# Patient Record
Sex: FEMALE | Race: WHITE | Employment: UNEMPLOYED | URBAN - NONMETROPOLITAN AREA
[De-identification: names, ages, dates, MRNs, and addresses within clinical notes are randomized per-mention and may not be internally consistent; named-entity substitution may affect disease eponyms.]

---

## 2022-12-05 RX ORDER — HYDROCHLOROTHIAZIDE 25 MG/1
25 TABLET ORAL DAILY
COMMUNITY

## 2022-12-05 RX ORDER — PROPRANOLOL HYDROCHLORIDE 80 MG/1
80 CAPSULE, EXTENDED RELEASE ORAL DAILY
COMMUNITY

## 2022-12-05 RX ORDER — MULTIVITAMIN WITH IRON
250 TABLET ORAL DAILY
COMMUNITY

## 2022-12-06 ENCOUNTER — HOSPITAL ENCOUNTER (OUTPATIENT)
Dept: PAIN MANAGEMENT | Age: 57
Discharge: HOME | End: 2022-12-06
Payer: COMMERCIAL

## 2022-12-06 ENCOUNTER — HOSPITAL ENCOUNTER (OUTPATIENT)
Dept: PAIN MANAGEMENT | Age: 57
Setting detail: SPECIMEN
Discharge: HOME | End: 2022-12-06
Payer: COMMERCIAL

## 2022-12-06 VITALS
SYSTOLIC BLOOD PRESSURE: 127 MMHG | DIASTOLIC BLOOD PRESSURE: 76 MMHG | HEART RATE: 58 BPM | TEMPERATURE: 97.4 F | RESPIRATION RATE: 18 BRPM | OXYGEN SATURATION: 98 %

## 2022-12-06 DIAGNOSIS — R52 PAIN MANAGEMENT: ICD-10-CM

## 2022-12-06 PROCEDURE — 2580000003 HC RX 258

## 2022-12-06 PROCEDURE — A4216 STERILE WATER/SALINE, 10 ML: HCPCS

## 2022-12-06 PROCEDURE — 2500000003 HC RX 250 WO HCPCS

## 2022-12-06 PROCEDURE — 6360000002 HC RX W HCPCS

## 2022-12-06 ASSESSMENT — PAIN - FUNCTIONAL ASSESSMENT: PAIN_FUNCTIONAL_ASSESSMENT: 0-10

## 2022-12-06 NOTE — INTERVAL H&P NOTE
Update History & Physical    The patient's History and Physical  was reviewed with the patient and I examined the patient. There was no change. The surgical site was confirmed by the patient and me. Plan: The risks, benefits, expected outcome, and alternative to the recommended procedure have been discussed with the patient. Patient understands and wants to proceed with the procedure.      Electronically signed by Mahad Carter MD on 12/6/2022 at 11:00 AM

## 2022-12-13 ENCOUNTER — HOSPITAL ENCOUNTER (OUTPATIENT)
Dept: PAIN MANAGEMENT | Age: 57
Discharge: HOME | End: 2022-12-13
Payer: COMMERCIAL

## 2022-12-13 VITALS
SYSTOLIC BLOOD PRESSURE: 147 MMHG | DIASTOLIC BLOOD PRESSURE: 83 MMHG | TEMPERATURE: 97.4 F | HEART RATE: 65 BPM | RESPIRATION RATE: 18 BRPM | OXYGEN SATURATION: 98 %

## 2022-12-13 DIAGNOSIS — R52 PAIN MANAGEMENT: ICD-10-CM

## 2022-12-13 PROCEDURE — 6360000002 HC RX W HCPCS

## 2022-12-13 PROCEDURE — 2580000003 HC RX 258

## 2022-12-13 PROCEDURE — 62323 NJX INTERLAMINAR LMBR/SAC: CPT

## 2022-12-13 PROCEDURE — A4216 STERILE WATER/SALINE, 10 ML: HCPCS

## 2022-12-13 PROCEDURE — 2500000003 HC RX 250 WO HCPCS

## 2022-12-13 RX ORDER — SODIUM CHLORIDE 9 MG/ML
5 INJECTION INTRAVENOUS ONCE
Status: DISCONTINUED | OUTPATIENT
Start: 2022-12-13 | End: 2022-12-15 | Stop reason: HOSPADM

## 2022-12-13 RX ORDER — METHYLPREDNISOLONE ACETATE 80 MG/ML
80 INJECTION, SUSPENSION INTRA-ARTICULAR; INTRALESIONAL; INTRAMUSCULAR; SOFT TISSUE ONCE
Status: DISCONTINUED | OUTPATIENT
Start: 2022-12-13 | End: 2022-12-15 | Stop reason: HOSPADM

## 2022-12-13 RX ORDER — LIDOCAINE HYDROCHLORIDE 10 MG/ML
5 INJECTION, SOLUTION EPIDURAL; INFILTRATION; INTRACAUDAL; PERINEURAL ONCE
Status: DISCONTINUED | OUTPATIENT
Start: 2022-12-13 | End: 2022-12-15 | Stop reason: HOSPADM

## 2022-12-13 ASSESSMENT — PAIN - FUNCTIONAL ASSESSMENT: PAIN_FUNCTIONAL_ASSESSMENT: 0-10

## 2022-12-13 NOTE — INTERVAL H&P NOTE
Update History & Physical    The patient's History and Physical  was reviewed with the patient and I examined the patient. There was no change. The surgical site was confirmed by the patient and me. Plan: The risks, benefits, expected outcome, and alternative to the recommended procedure have been discussed with the patient. Patient understands and wants to proceed with the procedure.      Electronically signed by Mahad Carter MD on 12/13/2022 at 9:06 AM

## 2023-11-28 ENCOUNTER — OFFICE VISIT (OUTPATIENT)
Dept: NEUROSURGERY | Facility: CLINIC | Age: 58
End: 2023-11-28
Payer: COMMERCIAL

## 2023-11-28 VITALS — WEIGHT: 150 LBS | HEIGHT: 65 IN | BODY MASS INDEX: 24.99 KG/M2

## 2023-11-28 DIAGNOSIS — M54.16 LUMBAR RADICULOPATHY: ICD-10-CM

## 2023-11-28 DIAGNOSIS — M51.36 DEGENERATIVE DISC DISEASE, LUMBAR: ICD-10-CM

## 2023-11-28 DIAGNOSIS — Z78.9 NON-SMOKER: Primary | ICD-10-CM

## 2023-11-28 PROCEDURE — 99204 OFFICE O/P NEW MOD 45 MIN: CPT | Performed by: NEUROLOGICAL SURGERY

## 2023-11-28 RX ORDER — HYDROCHLOROTHIAZIDE 25 MG/1
25 TABLET ORAL DAILY
COMMUNITY

## 2023-11-28 RX ORDER — ACETAMINOPHEN 500 MG
1000 TABLET ORAL EVERY 4 HOURS PRN
COMMUNITY

## 2023-11-28 RX ORDER — TRAMADOL HYDROCHLORIDE 50 MG/1
1 TABLET ORAL 4 TIMES DAILY PRN
COMMUNITY

## 2023-11-28 RX ORDER — CYCLOBENZAPRINE HCL 10 MG
10 TABLET ORAL 3 TIMES DAILY PRN
COMMUNITY
Start: 2023-11-22

## 2023-11-28 RX ORDER — PROPRANOLOL HYDROCHLORIDE 80 MG/1
80 CAPSULE, EXTENDED RELEASE ORAL DAILY
COMMUNITY

## 2023-11-28 RX ORDER — GABAPENTIN 300 MG/1
300 CAPSULE ORAL
COMMUNITY
Start: 2023-11-20

## 2023-11-28 RX ORDER — LOSARTAN POTASSIUM 25 MG/1
1 TABLET ORAL DAILY
COMMUNITY
Start: 2023-08-28

## 2023-11-28 RX ORDER — SEMAGLUTIDE 0.68 MG/ML
0.25 INJECTION, SOLUTION SUBCUTANEOUS WEEKLY
COMMUNITY

## 2023-11-28 RX ORDER — MELOXICAM 7.5 MG/1
1 TABLET ORAL 2 TIMES DAILY
COMMUNITY
Start: 2023-08-30

## 2023-11-28 NOTE — PROGRESS NOTES
"    Chief complaint:   Chief Complaint   Patient presents with    Back Pain     Low back pain radiating into left leg and pelvis        Subjective     HPI: I had a chance to see Jyoti today in follow-up and to review her new MRI of the lumbar spine.  She still has a very large disc herniation at L3/4 that moved caudally and clinically she sounds like she is suffering from an L4 radiculopathy.  She does have more room on this side from her hemilaminectomy however I think that the disc herniation is still the major problem although we did run into a large amount of scarring from her initial surgery that limited how much resection we could do without destroying the joint.  She does feel that she got some relief from the original surgery but she still has significant pain when turning over in bed or getting in certain positions.    Review of Systems      Objective      Vital Signs  Ht 165.1 cm (65\")   Wt 68 kg (150 lb)   BMI 24.96 kg/m²     Physical Exam  Neurological:      Mental Status: She is oriented to person, place, and time.      Cranial Nerves: Cranial nerves 2-12 are intact.      Motor: Motor strength is normal.     Gait: Gait is intact.   Psychiatric:         Speech: Speech normal.         Neurologic Exam     Mental Status   Oriented to person, place, and time.   Attention: normal. Concentration: normal.   Speech: speech is normal   Level of consciousness: alert  Knowledge: good.   Normal comprehension.     Cranial Nerves   Cranial nerves II through XII intact.     Motor Exam     Strength   Strength 5/5 throughout.     Sensory Exam   Light touch normal.     Gait, Coordination, and Reflexes     Gait  Gait: normal      Imaging review: MRI of the lumbar spine still shows a disc herniation at L3/4 with caudal migration along the L4 pedicle which makes sense with her symptoms.  She does have very severe degenerative changes at L3/4 also.        Assessment/Plan:   Severe L3-4 disc degeneration with disc " extrusion and L4 radiculopathy    At this point we have tried a number of conservative modalities to try to get her pain under control along with a smaller hemilaminectomy although resection of the disc was limited secondary to scarring from her initial surgery and it is likely going to require resection of the facet to get back down along the L4 pedicle to get as much disc out as possible.  She is unfortunately going to need a fusion at L3/4 and I did offer her a L3/4 transforaminal lumbar interbody fusion to hopefully give her some relief.  We will have to modify her instrumentation previously placed from L3-S1.  I did discuss the risks and benefits of this procedure with her at length and she would like to proceed.  We will work on getting her medically cleared and on the operative schedule at our first opening.    Patient is a nonsmoker  The patient's Body mass index is 24.96 kg/m².. BMI is above normal parameters. Recommendations include: continue with current weight loss program    Diagnoses and all orders for this visit:    1. Non-smoker (Primary)    2. Degenerative disc disease, lumbar    3. Lumbar radiculopathy        I discussed the patients findings and my recommendations with patient  Shin Rojo DO  11/28/23  14:45 CST

## 2023-12-19 PROBLEM — M51.369 DEGENERATIVE DISC DISEASE, LUMBAR: Status: ACTIVE | Noted: 2023-11-28

## 2023-12-19 PROBLEM — M51.36 DEGENERATIVE DISC DISEASE, LUMBAR: Status: ACTIVE | Noted: 2023-11-28

## 2023-12-19 PROBLEM — M54.16 LUMBAR RADICULOPATHY: Status: ACTIVE | Noted: 2023-11-28

## 2024-01-16 ENCOUNTER — TELEPHONE (OUTPATIENT)
Dept: NEUROSURGERY | Facility: CLINIC | Age: 59
End: 2024-01-16
Payer: COMMERCIAL

## 2024-01-16 NOTE — TELEPHONE ENCOUNTER
RECEIVED A VOICE MAIL FROM PT THAT AT THIS TIME SHE WOULD LIKE TO CANCEL HER SURGERY. SHE WOULD LIKE TO CONTINUE TO RESEARCH HER OPTIONS PRIOR TO COMMITTING TO THE SURGERY. SHE STATES SHE WILL CALL US BACK IF AND WHEN SHE IS READY TO R/S.     I DID CALL PT BACK AND LET HER KNOW I HAD CANCELLED HER SURGERY AND PRE OP APPTOINTMENTS. PT VOICED UNDERSTANDING.

## 2024-02-12 ENCOUNTER — OFFICE VISIT (OUTPATIENT)
Dept: NEUROSURGERY | Facility: CLINIC | Age: 59
End: 2024-02-12
Payer: COMMERCIAL

## 2024-02-12 VITALS — HEIGHT: 65 IN | WEIGHT: 150 LBS | BODY MASS INDEX: 24.99 KG/M2

## 2024-02-12 DIAGNOSIS — Z78.9 NON-SMOKER: ICD-10-CM

## 2024-02-12 DIAGNOSIS — M54.16 LUMBAR RADICULOPATHY: ICD-10-CM

## 2024-02-12 DIAGNOSIS — M51.36 DEGENERATIVE DISC DISEASE, LUMBAR: Primary | ICD-10-CM

## 2024-02-12 PROCEDURE — 99213 OFFICE O/P EST LOW 20 MIN: CPT | Performed by: NEUROLOGICAL SURGERY

## 2024-02-12 RX ORDER — GABAPENTIN 300 MG/1
300 CAPSULE ORAL
COMMUNITY

## 2024-02-12 RX ORDER — NORTRIPTYLINE HYDROCHLORIDE 10 MG/1
10 CAPSULE ORAL NIGHTLY
COMMUNITY
Start: 2023-12-14

## 2024-02-12 RX ORDER — CARVEDILOL 12.5 MG/1
12.5 TABLET ORAL 2 TIMES DAILY WITH MEALS
COMMUNITY
Start: 2024-01-22

## 2024-02-27 ENCOUNTER — TELEPHONE (OUTPATIENT)
Dept: NEUROSURGERY | Facility: CLINIC | Age: 59
End: 2024-02-27
Payer: COMMERCIAL

## 2024-02-27 NOTE — TELEPHONE ENCOUNTER
----- Message from Marly Emery CMA sent at 2/26/2024  7:39 PM CST -----  Regarding: FW: Cracked ribs  Contact: [phone]    ----- Message -----  From: Jyoti Dick  Sent: 2/26/2024   6:36 PM CST  To: [facility] Clinical Pool  Subject: Cracked ribs                                     Hi Dr. Rojo.     I took a fall last Monday and ended up in the ER at [facility]. I have 3 cracked ribs and a couple staples in my head. I also spent a couple days in [facility] last week due to low oxygen levels from shortness of breath, caused by pain.  I’m hoping this will not affect my surgery date with you on March 28. Let me know what you think.      Best,   Juani Dick

## 2024-03-18 ENCOUNTER — PRE-ADMISSION TESTING (OUTPATIENT)
Dept: PREADMISSION TESTING | Facility: HOSPITAL | Age: 59
End: 2024-03-18
Payer: COMMERCIAL

## 2024-03-18 VITALS
DIASTOLIC BLOOD PRESSURE: 78 MMHG | WEIGHT: 173.28 LBS | HEIGHT: 64 IN | OXYGEN SATURATION: 98 % | BODY MASS INDEX: 29.58 KG/M2 | HEART RATE: 74 BPM | RESPIRATION RATE: 16 BRPM | SYSTOLIC BLOOD PRESSURE: 164 MMHG

## 2024-03-18 LAB
ANION GAP SERPL CALCULATED.3IONS-SCNC: 9 MMOL/L (ref 5–15)
BUN SERPL-MCNC: 24 MG/DL (ref 6–20)
BUN/CREAT SERPL: 54.5 (ref 7–25)
CALCIUM SPEC-SCNC: 9.6 MG/DL (ref 8.6–10.5)
CHLORIDE SERPL-SCNC: 99 MMOL/L (ref 98–107)
CO2 SERPL-SCNC: 31 MMOL/L (ref 22–29)
CREAT SERPL-MCNC: 0.44 MG/DL (ref 0.57–1)
DEPRECATED RDW RBC AUTO: 50.5 FL (ref 37–54)
EGFRCR SERPLBLD CKD-EPI 2021: 111.6 ML/MIN/1.73
ERYTHROCYTE [DISTWIDTH] IN BLOOD BY AUTOMATED COUNT: 13.8 % (ref 12.3–15.4)
GLUCOSE SERPL-MCNC: 101 MG/DL (ref 65–99)
HCT VFR BLD AUTO: 38.1 % (ref 34–46.6)
HGB BLD-MCNC: 12.3 G/DL (ref 12–15.9)
MCH RBC QN AUTO: 32.2 PG (ref 26.6–33)
MCHC RBC AUTO-ENTMCNC: 32.3 G/DL (ref 31.5–35.7)
MCV RBC AUTO: 99.7 FL (ref 79–97)
PLATELET # BLD AUTO: 249 10*3/MM3 (ref 140–450)
PMV BLD AUTO: 9.8 FL (ref 6–12)
POTASSIUM SERPL-SCNC: 4.2 MMOL/L (ref 3.5–5.2)
RBC # BLD AUTO: 3.82 10*6/MM3 (ref 3.77–5.28)
SODIUM SERPL-SCNC: 139 MMOL/L (ref 136–145)
WBC NRBC COR # BLD AUTO: 7.51 10*3/MM3 (ref 3.4–10.8)

## 2024-03-18 PROCEDURE — 80048 BASIC METABOLIC PNL TOTAL CA: CPT

## 2024-03-18 PROCEDURE — 36415 COLL VENOUS BLD VENIPUNCTURE: CPT

## 2024-03-18 PROCEDURE — 85027 COMPLETE CBC AUTOMATED: CPT

## 2024-03-18 PROCEDURE — 93005 ELECTROCARDIOGRAM TRACING: CPT

## 2024-03-18 RX ORDER — LANOLIN ALCOHOL/MO/W.PET/CERES
1000 CREAM (GRAM) TOPICAL DAILY
COMMUNITY

## 2024-03-18 RX ORDER — CALCIUM CARBONATE 500 MG/1
1 TABLET, CHEWABLE ORAL 2 TIMES DAILY
COMMUNITY

## 2024-03-18 RX ORDER — ACETAMINOPHEN 500 MG
1000 TABLET ORAL EVERY 6 HOURS PRN
COMMUNITY

## 2024-03-18 RX ORDER — UREA 10 %
140 LOTION (ML) TOPICAL WEEKLY
COMMUNITY

## 2024-03-18 NOTE — DISCHARGE INSTRUCTIONS
Preparing for Surgery  Follow these instructions before the procedure:  Several days or weeks before your procedure  Medication(s) you need to stop   ____7___ days/week prior to surgery - ozempic      Ask your health care provider about:  Changing or stopping your regular medicines. This is especially important if you are taking diabetes medicines or blood thinners.  Taking medicines such as aspirin and non-steroidal anti-inflammatory drugs (NSAIDS) such as ibuprofen that can thin your blood. Do not take these medicines unless your health care provider tells you to take them.  Taking over-the-counter medicines, vitamins, herbs, and supplements.  Contact your surgeon if you:  Develop a fever of more than 100.4°F (38°C) or other feelings of illness during the 48 hours before your surgery.  Have symptoms that get worse.  Have questions or concerns about your surgery.  If you are going home the same day of your surgery you will need to arrange for a responsible adult, age 18 years old or older, to drive you home from the hospital and stay with you for 24 hours. Verification of the  will be made prior to any procedure requiring sedation. You may not go home in a taxi or any form of public transportation by yourself.     Day before your procedure  Medication(s) you need to stop the day before your surgery: losartan    24 hours before your procedure DO NOT drink alcoholic beverages or smoke.  24 hours before your procedure STOP taking Erectile Dysfunction medication (i.e.,Cialis, Viagra)   You may be asked to shower with a germ-killing soap.  Day of your procedure   You may take the following medication(s) the morning of surgery with a sip of water: carvedilol, nexium, tramadol, gabapentin      8 hours before your procedure STOP all food, any dairy products, and full liquids. This includes hard candy, chewing gum or mints. This is extremely important to prevent serious complications.   Up to 2 hours before your  scheduled arrival time, you may have clear liquids no cream, powder, or pulp of any kind. Safe options are water, black coffee, plain tea, soda, Gatorade/Powerade, clear broth, apple juice.  2 hours before your scheduled arrival time, STOP drinking clear liquids.  You may need to take another shower with a germ-killing soap before you leave home in the morning. Do not use perfumes, colognes, or body lotions.  Wear comfortable loose-fitting clothing.  Remove all jewelry including body piercing and rings, dark colored nail polish, and make up prior to arrival at the hospital. Leave all valuables at home.   Bring your hearing aids if you rely on them.  Do not wear contact lenses. If you wear eyeglasses remember to bring a case to store them in while you are in surgery.  Do not use denture adhesives since you will be asked to remove them during your surgery.    You do not need to bring your home medications into the hospital.   Bring your sleep apnea device with you on the day of your surgery (if this applies to you).  If you wear portable oxygen, bring it with you.   If you are staying overnight, you may bring a bag of items you may need such as slippers, robe and a change of clothes for your discharge. You may want to leave these items in the car until you are ready for them since your family will take your belongings when you leave the pre-operative area.  Arrive at the hospital as scheduled by the office. You will be asked to arrive 2 hours prior to your surgery time in order to prepare for your procedure.  When you arrive at the hospital  Go to the registration desk located at the main entrance of the hospital.  After registration is completed, you will be given a beeper and a sticker sheet. Take the stickers to Outpatient Surgery and place in the tray at the end of the desk to notify the staff that you have arrived and registered.   Return to the lobby to wait. You are not always called back according to the time  of arrival but rather the time your doctor will be ready.  When your beeper lights up and vibrates proceed through the double doors, under the stairs, and a member of the Outpatient Surgery staff will escort you to your preoperative room.   How to Use Chlorhexidine Before Surgery  Chlorhexidine gluconate (CHG) is a germ-killing (antiseptic) solution that is used to clean the skin. It can get rid of the bacteria that normally live on the skin and can keep them away for about 24 hours. To clean your skin with CHG, you may be given:  A CHG solution to use in the shower or as part of a sponge bath.  A prepackaged cloth that contains CHG.  Cleaning your skin with CHG may help lower the risk for infection:  While you are staying in the intensive care unit of the hospital.  If you have a vascular access, such as a central line, to provide short-term or long-term access to your veins.  If you have a catheter to drain urine from your bladder.  If you are on a ventilator. A ventilator is a machine that helps you breathe by moving air in and out of your lungs.  After surgery.  What are the risks?  Risks of using CHG include:  A skin reaction.  Hearing loss, if CHG gets in your ears and you have a perforated eardrum.  Eye injury, if CHG gets in your eyes and is not rinsed out.  The CHG product catching fire.  Make sure that you avoid smoking and flames after applying CHG to your skin.  Do not use CHG:  If you have a chlorhexidine allergy or have previously reacted to chlorhexidine.  On babies younger than 2 months of age.  How to use CHG solution  Use CHG only as told by your health care provider, and follow the instructions on the label.  Use the full amount of CHG as directed. Usually, this is one bottle.  During a shower    Follow these steps when using CHG solution during a shower (unless your health care provider gives you different instructions):  Start the shower.  Use your normal soap and shampoo to wash your face and  hair.  Turn off the shower or move out of the shower stream.  Pour the CHG onto a clean washcloth. Do not use any type of brush or rough-edged sponge.  Starting at your neck, lather your body down to your toes. Make sure you follow these instructions:  If you will be having surgery, pay special attention to the part of your body where you will be having surgery. Scrub this area for at least 1 minute.  Do not use CHG on your head or face. If the solution gets into your ears or eyes, rinse them well with water.  Avoid your genital area.  Avoid any areas of skin that have broken skin, cuts, or scrapes.  Scrub your back and under your arms. Make sure to wash skin folds.  Let the lather sit on your skin for 1-2 minutes or as long as told by your health care provider.  Thoroughly rinse your entire body in the shower. Make sure that all body creases and crevices are rinsed well.  Dry off with a clean towel. Do not put any substances on your body afterward--such as powder, lotion, or perfume--unless you are told to do so by your health care provider. Only use lotions that are recommended by the .  Put on clean clothes or pajamas.  If it is the night before your surgery, sleep in clean sheets.     During a sponge bath  Follow these steps when using CHG solution during a sponge bath (unless your health care provider gives you different instructions):  Use your normal soap and shampoo to wash your face and hair.  Pour the CHG onto a clean washcloth.  Starting at your neck, lather your body down to your toes. Make sure you follow these instructions:  If you will be having surgery, pay special attention to the part of your body where you will be having surgery. Scrub this area for at least 1 minute.  Do not use CHG on your head or face. If the solution gets into your ears or eyes, rinse them well with water.  Avoid your genital area.  Avoid any areas of skin that have broken skin, cuts, or scrapes.  Scrub your back  and under your arms. Make sure to wash skin folds.  Let the lather sit on your skin for 1-2 minutes or as long as told by your health care provider.  Using a different clean, wet washcloth, thoroughly rinse your entire body. Make sure that all body creases and crevices are rinsed well.  Dry off with a clean towel. Do not put any substances on your body afterward--such as powder, lotion, or perfume--unless you are told to do so by your health care provider. Only use lotions that are recommended by the .  Put on clean clothes or pajamas.  If it is the night before your surgery, sleep in clean sheets.  How to use CHG prepackaged cloths  Only use CHG cloths as told by your health care provider, and follow the instructions on the label.  Use the CHG cloth on clean, dry skin.  Do not use the CHG cloth on your head or face unless your health care provider tells you to.  When washing with the CHG cloth:  Avoid your genital area.  Avoid any areas of skin that have broken skin, cuts, or scrapes.  Before surgery    Follow these steps when using a CHG cloth to clean before surgery (unless your health care provider gives you different instructions):  Using the CHG cloth, vigorously scrub the part of your body where you will be having surgery. Scrub using a back-and-forth motion for 3 minutes. The area on your body should be completely wet with CHG when you are done scrubbing.  Do not rinse. Discard the cloth and let the area air-dry. Do not put any substances on the area afterward, such as powder, lotion, or perfume.  Put on clean clothes or pajamas.  If it is the night before your surgery, sleep in clean sheets.     For general bathing  Follow these steps when using CHG cloths for general bathing (unless your health care provider gives you different instructions).  Use a separate CHG cloth for each area of your body. Make sure you wash between any folds of skin and between your fingers and toes. Wash your body in  the following order, switching to a new cloth after each step:  The front of your neck, shoulders, and chest.  Both of your arms, under your arms, and your hands.  Your stomach and groin area, avoiding the genitals.  Your right leg and foot.  Your left leg and foot.  The back of your neck, your back, and your buttocks.  Do not rinse. Discard the cloth and let the area air-dry. Do not put any substances on your body afterward--such as powder, lotion, or perfume--unless you are told to do so by your health care provider. Only use lotions that are recommended by the .  Put on clean clothes or pajamas.  Contact a health care provider if:  Your skin gets irritated after scrubbing.  You have questions about using your solution or cloth.  You swallow any chlorhexidine. Call your local poison control center (1-454.602.7846 in the [place]  Get help right away if:  Your eyes itch badly, or they become very red or swollen.  Your skin itches badly and is red or swollen.  Your hearing changes.  You have trouble seeing.  You have swelling or tingling in your mouth or throat.  You have trouble breathing.  These symptoms may represent a serious problem that is an emergency. Do not wait to see if the symptoms will go away. Get medical help right away. Call your local emergency services (284 in the [place] Do not drive yourself to the hospital.  Summary  Chlorhexidine gluconate (CHG) is a germ-killing (antiseptic) solution that is used to clean the skin. Cleaning your skin with CHG may help to lower your risk for infection.  You may be given CHG to use for bathing. It may be in a bottle or in a prepackaged cloth to use on your skin. Carefully follow your health care provider's instructions and the instructions on the product label.  Do not use CHG if you have a chlorhexidine allergy.  Contact your health care provider if your skin gets irritated after scrubbing.  This information is not intended to replace advice given to  you by your health care provider. Make sure you discuss any questions you have with your health care provider.  Document Revised: 04/17/2023 Document Reviewed: 02/28/2022  Elsevier Patient Education © 2023 [employer]

## 2024-03-20 LAB
QT INTERVAL: 430 MS
QTC INTERVAL: 460 MS

## 2024-03-28 ENCOUNTER — APPOINTMENT (OUTPATIENT)
Dept: GENERAL RADIOLOGY | Facility: HOSPITAL | Age: 59
End: 2024-03-28
Payer: COMMERCIAL

## 2024-03-28 ENCOUNTER — ANESTHESIA EVENT (OUTPATIENT)
Dept: PERIOP | Facility: HOSPITAL | Age: 59
End: 2024-03-28
Payer: COMMERCIAL

## 2024-03-28 ENCOUNTER — HOSPITAL ENCOUNTER (OUTPATIENT)
Facility: HOSPITAL | Age: 59
Discharge: HOME | End: 2024-03-29
Attending: NEUROLOGICAL SURGERY | Admitting: NEUROLOGICAL SURGERY
Payer: COMMERCIAL

## 2024-03-28 ENCOUNTER — ANESTHESIA (OUTPATIENT)
Dept: PERIOP | Facility: HOSPITAL | Age: 59
End: 2024-03-28
Payer: COMMERCIAL

## 2024-03-28 DIAGNOSIS — M43.16 SPONDYLOLISTHESIS OF LUMBAR REGION: ICD-10-CM

## 2024-03-28 DIAGNOSIS — Z74.09 IMPAIRED FUNCTIONAL MOBILITY AND ACTIVITY TOLERANCE: Primary | ICD-10-CM

## 2024-03-28 DIAGNOSIS — M51.36 DEGENERATIVE DISC DISEASE, LUMBAR: ICD-10-CM

## 2024-03-28 DIAGNOSIS — M54.16 LUMBAR RADICULOPATHY: ICD-10-CM

## 2024-03-28 PROBLEM — M43.10 SPONDYLOLISTHESIS: Status: ACTIVE | Noted: 2024-03-28

## 2024-03-28 LAB
GLUCOSE BLDC GLUCOMTR-MCNC: 139 MG/DL (ref 70–130)
GLUCOSE BLDC GLUCOMTR-MCNC: 154 MG/DL (ref 70–130)

## 2024-03-28 PROCEDURE — C1713 ANCHOR/SCREW BN/BN,TIS/BN: HCPCS | Performed by: NEUROLOGICAL SURGERY

## 2024-03-28 PROCEDURE — 25010000002 HYDROMORPHONE PER 4 MG: Performed by: ANESTHESIOLOGY

## 2024-03-28 PROCEDURE — 25010000002 PHENYLEPHRINE 10 MG/ML SOLUTION 1 ML VIAL: Performed by: NURSE ANESTHETIST, CERTIFIED REGISTERED

## 2024-03-28 PROCEDURE — 25010000002 FENTANYL CITRATE (PF) 50 MCG/ML SOLUTION: Performed by: ANESTHESIOLOGY

## 2024-03-28 PROCEDURE — 25010000002 FENTANYL CITRATE (PF) 250 MCG/5ML SOLUTION: Performed by: NURSE ANESTHETIST, CERTIFIED REGISTERED

## 2024-03-28 PROCEDURE — 72100 X-RAY EXAM L-S SPINE 2/3 VWS: CPT

## 2024-03-28 PROCEDURE — 82948 REAGENT STRIP/BLOOD GLUCOSE: CPT

## 2024-03-28 PROCEDURE — 25010000002 VANCOMYCIN 1 G RECONSTITUTED SOLUTION 1 EACH VIAL: Performed by: NEUROLOGICAL SURGERY

## 2024-03-28 PROCEDURE — 22853 INSJ BIOMECHANICAL DEVICE: CPT | Performed by: NEUROLOGICAL SURGERY

## 2024-03-28 PROCEDURE — 25810000003 SODIUM CHLORIDE 0.9 % SOLUTION 250 ML FLEX CONT: Performed by: NURSE ANESTHETIST, CERTIFIED REGISTERED

## 2024-03-28 PROCEDURE — S0260 H&P FOR SURGERY: HCPCS | Performed by: NEUROLOGICAL SURGERY

## 2024-03-28 PROCEDURE — 76000 FLUOROSCOPY <1 HR PHYS/QHP: CPT

## 2024-03-28 PROCEDURE — 22630 ARTHRD PST TQ 1NTRSPC LUM: CPT | Performed by: NEUROLOGICAL SURGERY

## 2024-03-28 PROCEDURE — 61783 SCAN PROC SPINAL: CPT | Performed by: NEUROLOGICAL SURGERY

## 2024-03-28 PROCEDURE — 25010000002 CEFAZOLIN 3 G RECONSTITUTED SOLUTION 1 EACH VIAL: Performed by: NEUROLOGICAL SURGERY

## 2024-03-28 PROCEDURE — G0378 HOSPITAL OBSERVATION PER HR: HCPCS

## 2024-03-28 PROCEDURE — C1769 GUIDE WIRE: HCPCS | Performed by: NEUROLOGICAL SURGERY

## 2024-03-28 PROCEDURE — 25810000003 LACTATED RINGERS PER 1000 ML: Performed by: NEUROLOGICAL SURGERY

## 2024-03-28 PROCEDURE — 22849 REINSERT SPINAL FIXATION: CPT | Performed by: NEUROLOGICAL SURGERY

## 2024-03-28 PROCEDURE — 63052 LAM FACETC/FRMT ARTHRD LUM 1: CPT | Performed by: NEUROLOGICAL SURGERY

## 2024-03-28 PROCEDURE — 25010000002 KETOROLAC TROMETHAMINE PER 15 MG: Performed by: NEUROLOGICAL SURGERY

## 2024-03-28 PROCEDURE — 25010000002 DROPERIDOL PER 5 MG: Performed by: ANESTHESIOLOGY

## 2024-03-28 PROCEDURE — 25010000002 BUPIVACAINE 0.25 % SOLUTION: Performed by: NEUROLOGICAL SURGERY

## 2024-03-28 PROCEDURE — 25010000002 PROPOFOL 10 MG/ML EMULSION: Performed by: NURSE ANESTHETIST, CERTIFIED REGISTERED

## 2024-03-28 DEVICE — SET SCREW 6540530 5.5/6.0 SOLERA VOYAGER
Type: IMPLANTABLE DEVICE | Site: SPINE LUMBAR | Status: FUNCTIONAL
Brand: CD HORIZON® SOLERA® SPINAL SYSTEM

## 2024-03-28 DEVICE — SCREW 55850016545 5.5 VOYAGER MAS 6.5X45
Type: IMPLANTABLE DEVICE | Site: SPINE LUMBAR | Status: FUNCTIONAL
Brand: CD HORIZON® SOLERA® VOYAGER™ SPINAL SYSTEM

## 2024-03-28 DEVICE — DBM T43105 5CC GRAFTON PUTTY
Type: IMPLANTABLE DEVICE | Site: SPINE LUMBAR | Status: FUNCTIONAL
Brand: GRAFTON®AND GRAFTON PLUS®DEMINERALIZED BONE MATRIX (DBM)

## 2024-03-28 DEVICE — SPACER 6068116 CATALYFT PL LONG 11MM
Type: IMPLANTABLE DEVICE | Site: SPINE LUMBAR | Status: FUNCTIONAL
Brand: CATALYFT PL EXPANDABLE INTERBODY SYSTEM

## 2024-03-28 DEVICE — BIOLOGIC 7600105 85 BTCP 15 HA 5CC VIAL
Type: IMPLANTABLE DEVICE | Site: SPINE LUMBAR | Status: FUNCTIONAL
Brand: MASTERGRAFT® GRANULES

## 2024-03-28 RX ORDER — OXYCODONE AND ACETAMINOPHEN 7.5; 325 MG/1; MG/1
1 TABLET ORAL EVERY 6 HOURS PRN
Status: DISCONTINUED | OUTPATIENT
Start: 2024-03-28 | End: 2024-03-29 | Stop reason: HOSPADM

## 2024-03-28 RX ORDER — DROPERIDOL 2.5 MG/ML
0.62 INJECTION, SOLUTION INTRAMUSCULAR; INTRAVENOUS ONCE AS NEEDED
Status: COMPLETED | OUTPATIENT
Start: 2024-03-28 | End: 2024-03-28

## 2024-03-28 RX ORDER — BUPIVACAINE HCL/0.9 % NACL/PF 0.125 %
PLASTIC BAG, INJECTION (ML) EPIDURAL AS NEEDED
Status: DISCONTINUED | OUTPATIENT
Start: 2024-03-28 | End: 2024-03-28 | Stop reason: SURG

## 2024-03-28 RX ORDER — SODIUM CHLORIDE 0.9 % (FLUSH) 0.9 %
10 SYRINGE (ML) INJECTION AS NEEDED
Status: DISCONTINUED | OUTPATIENT
Start: 2024-03-28 | End: 2024-03-28 | Stop reason: HOSPADM

## 2024-03-28 RX ORDER — MAGNESIUM HYDROXIDE 1200 MG/15ML
LIQUID ORAL AS NEEDED
Status: DISCONTINUED | OUTPATIENT
Start: 2024-03-28 | End: 2024-03-28 | Stop reason: HOSPADM

## 2024-03-28 RX ORDER — BUPIVACAINE HYDROCHLORIDE 2.5 MG/ML
INJECTION, SOLUTION INFILTRATION; PERINEURAL AS NEEDED
Status: DISCONTINUED | OUTPATIENT
Start: 2024-03-28 | End: 2024-03-28 | Stop reason: HOSPADM

## 2024-03-28 RX ORDER — SUCCINYLCHOLINE/SOD CL,ISO/PF 200MG/10ML
SYRINGE (ML) INTRAVENOUS AS NEEDED
Status: DISCONTINUED | OUTPATIENT
Start: 2024-03-28 | End: 2024-03-28 | Stop reason: SURG

## 2024-03-28 RX ORDER — FENTANYL CITRATE 50 UG/ML
50 INJECTION, SOLUTION INTRAMUSCULAR; INTRAVENOUS
Status: DISCONTINUED | OUTPATIENT
Start: 2024-03-28 | End: 2024-03-28 | Stop reason: HOSPADM

## 2024-03-28 RX ORDER — EPHEDRINE SULFATE 50 MG/ML
INJECTION INTRAVENOUS AS NEEDED
Status: DISCONTINUED | OUTPATIENT
Start: 2024-03-28 | End: 2024-03-28 | Stop reason: SURG

## 2024-03-28 RX ORDER — ONDANSETRON 2 MG/ML
4 INJECTION INTRAMUSCULAR; INTRAVENOUS
Status: DISCONTINUED | OUTPATIENT
Start: 2024-03-28 | End: 2024-03-28 | Stop reason: HOSPADM

## 2024-03-28 RX ORDER — ACETAMINOPHEN 500 MG
1000 TABLET ORAL ONCE
Status: COMPLETED | OUTPATIENT
Start: 2024-03-28 | End: 2024-03-28

## 2024-03-28 RX ORDER — SODIUM CHLORIDE, SODIUM LACTATE, POTASSIUM CHLORIDE, CALCIUM CHLORIDE 600; 310; 30; 20 MG/100ML; MG/100ML; MG/100ML; MG/100ML
100 INJECTION, SOLUTION INTRAVENOUS CONTINUOUS
Status: DISCONTINUED | OUTPATIENT
Start: 2024-03-28 | End: 2024-03-28

## 2024-03-28 RX ORDER — OXYCODONE AND ACETAMINOPHEN 10; 325 MG/1; MG/1
1 TABLET ORAL EVERY 4 HOURS PRN
Status: DISCONTINUED | OUTPATIENT
Start: 2024-03-28 | End: 2024-03-28 | Stop reason: HOSPADM

## 2024-03-28 RX ORDER — KETOROLAC TROMETHAMINE 15 MG/ML
15 INJECTION, SOLUTION INTRAMUSCULAR; INTRAVENOUS EVERY 6 HOURS PRN
Status: DISCONTINUED | OUTPATIENT
Start: 2024-03-28 | End: 2024-03-29 | Stop reason: HOSPADM

## 2024-03-28 RX ORDER — NALOXONE HCL 0.4 MG/ML
0.04 VIAL (ML) INJECTION AS NEEDED
Status: DISCONTINUED | OUTPATIENT
Start: 2024-03-28 | End: 2024-03-28 | Stop reason: HOSPADM

## 2024-03-28 RX ORDER — SODIUM CHLORIDE 0.9 % (FLUSH) 0.9 %
3-10 SYRINGE (ML) INJECTION AS NEEDED
Status: DISCONTINUED | OUTPATIENT
Start: 2024-03-28 | End: 2024-03-28 | Stop reason: HOSPADM

## 2024-03-28 RX ORDER — FENTANYL CITRATE 50 UG/ML
INJECTION, SOLUTION INTRAMUSCULAR; INTRAVENOUS AS NEEDED
Status: DISCONTINUED | OUTPATIENT
Start: 2024-03-28 | End: 2024-03-28 | Stop reason: SURG

## 2024-03-28 RX ORDER — IBUPROFEN 600 MG/1
600 TABLET ORAL EVERY 6 HOURS PRN
Status: DISCONTINUED | OUTPATIENT
Start: 2024-03-28 | End: 2024-03-28 | Stop reason: HOSPADM

## 2024-03-28 RX ORDER — GABAPENTIN 300 MG/1
900 CAPSULE ORAL 3 TIMES DAILY
Status: DISCONTINUED | OUTPATIENT
Start: 2024-03-28 | End: 2024-03-29 | Stop reason: HOSPADM

## 2024-03-28 RX ORDER — ROCURONIUM BROMIDE 10 MG/ML
INJECTION, SOLUTION INTRAVENOUS AS NEEDED
Status: DISCONTINUED | OUTPATIENT
Start: 2024-03-28 | End: 2024-03-28 | Stop reason: SURG

## 2024-03-28 RX ORDER — SODIUM CHLORIDE, SODIUM LACTATE, POTASSIUM CHLORIDE, CALCIUM CHLORIDE 600; 310; 30; 20 MG/100ML; MG/100ML; MG/100ML; MG/100ML
1000 INJECTION, SOLUTION INTRAVENOUS CONTINUOUS
Status: DISCONTINUED | OUTPATIENT
Start: 2024-03-28 | End: 2024-03-28

## 2024-03-28 RX ORDER — MIDAZOLAM HYDROCHLORIDE 1 MG/ML
1 INJECTION INTRAMUSCULAR; INTRAVENOUS
Status: DISCONTINUED | OUTPATIENT
Start: 2024-03-28 | End: 2024-03-28 | Stop reason: HOSPADM

## 2024-03-28 RX ORDER — LOSARTAN POTASSIUM 50 MG/1
50 TABLET ORAL DAILY
COMMUNITY

## 2024-03-28 RX ORDER — NORTRIPTYLINE HYDROCHLORIDE 10 MG/1
40 CAPSULE ORAL NIGHTLY
Status: DISCONTINUED | OUTPATIENT
Start: 2024-03-28 | End: 2024-03-29 | Stop reason: HOSPADM

## 2024-03-28 RX ORDER — LABETALOL HYDROCHLORIDE 5 MG/ML
5 INJECTION, SOLUTION INTRAVENOUS
Status: DISCONTINUED | OUTPATIENT
Start: 2024-03-28 | End: 2024-03-28 | Stop reason: HOSPADM

## 2024-03-28 RX ORDER — LIDOCAINE HYDROCHLORIDE 20 MG/ML
INJECTION, SOLUTION EPIDURAL; INFILTRATION; INTRACAUDAL; PERINEURAL AS NEEDED
Status: DISCONTINUED | OUTPATIENT
Start: 2024-03-28 | End: 2024-03-28 | Stop reason: SURG

## 2024-03-28 RX ORDER — SODIUM CHLORIDE 9 MG/ML
40 INJECTION, SOLUTION INTRAVENOUS AS NEEDED
Status: DISCONTINUED | OUTPATIENT
Start: 2024-03-28 | End: 2024-03-28 | Stop reason: HOSPADM

## 2024-03-28 RX ORDER — CYCLOBENZAPRINE HCL 10 MG
10 TABLET ORAL 3 TIMES DAILY PRN
Status: DISCONTINUED | OUTPATIENT
Start: 2024-03-28 | End: 2024-03-29 | Stop reason: HOSPADM

## 2024-03-28 RX ORDER — HYDROMORPHONE HYDROCHLORIDE 1 MG/ML
0.5 INJECTION, SOLUTION INTRAMUSCULAR; INTRAVENOUS; SUBCUTANEOUS
Status: COMPLETED | OUTPATIENT
Start: 2024-03-28 | End: 2024-03-28

## 2024-03-28 RX ORDER — SODIUM CHLORIDE 0.9 % (FLUSH) 0.9 %
3 SYRINGE (ML) INJECTION EVERY 12 HOURS SCHEDULED
Status: DISCONTINUED | OUTPATIENT
Start: 2024-03-28 | End: 2024-03-28 | Stop reason: HOSPADM

## 2024-03-28 RX ORDER — PROPOFOL 10 MG/ML
VIAL (ML) INTRAVENOUS AS NEEDED
Status: DISCONTINUED | OUTPATIENT
Start: 2024-03-28 | End: 2024-03-28 | Stop reason: SURG

## 2024-03-28 RX ORDER — CEPHALEXIN 500 MG/1
500 CAPSULE ORAL EVERY 6 HOURS
Status: COMPLETED | OUTPATIENT
Start: 2024-03-28 | End: 2024-03-29

## 2024-03-28 RX ORDER — SODIUM CHLORIDE 0.9 % (FLUSH) 0.9 %
3 SYRINGE (ML) INJECTION AS NEEDED
Status: DISCONTINUED | OUTPATIENT
Start: 2024-03-28 | End: 2024-03-28 | Stop reason: HOSPADM

## 2024-03-28 RX ORDER — LIDOCAINE HYDROCHLORIDE 10 MG/ML
0.5 INJECTION, SOLUTION EPIDURAL; INFILTRATION; INTRACAUDAL; PERINEURAL ONCE AS NEEDED
Status: DISCONTINUED | OUTPATIENT
Start: 2024-03-28 | End: 2024-03-28 | Stop reason: HOSPADM

## 2024-03-28 RX ORDER — ATORVASTATIN CALCIUM 10 MG/1
10 TABLET, FILM COATED ORAL NIGHTLY
COMMUNITY

## 2024-03-28 RX ORDER — FLUMAZENIL 0.1 MG/ML
0.2 INJECTION INTRAVENOUS AS NEEDED
Status: DISCONTINUED | OUTPATIENT
Start: 2024-03-28 | End: 2024-03-28 | Stop reason: HOSPADM

## 2024-03-28 RX ADMIN — Medication 100 MCG: at 09:18

## 2024-03-28 RX ADMIN — Medication 120 MG: at 08:53

## 2024-03-28 RX ADMIN — ROCURONIUM BROMIDE 10 MG: 10 INJECTION INTRAVENOUS at 08:53

## 2024-03-28 RX ADMIN — SODIUM CHLORIDE 3000 MG: 900 INJECTION INTRAVENOUS at 09:06

## 2024-03-28 RX ADMIN — EPHEDRINE SULFATE 10 MG: 50 INJECTION INTRAVENOUS at 09:13

## 2024-03-28 RX ADMIN — Medication 200 MCG: at 09:42

## 2024-03-28 RX ADMIN — EPHEDRINE SULFATE 20 MG: 50 INJECTION INTRAVENOUS at 09:10

## 2024-03-28 RX ADMIN — FENTANYL CITRATE 100 MCG: 0.05 INJECTION, SOLUTION INTRAMUSCULAR; INTRAVENOUS at 08:49

## 2024-03-28 RX ADMIN — CYCLOBENZAPRINE HYDROCHLORIDE 10 MG: 10 TABLET, FILM COATED ORAL at 15:42

## 2024-03-28 RX ADMIN — HYDROMORPHONE HYDROCHLORIDE 0.5 MG: 1 INJECTION, SOLUTION INTRAMUSCULAR; INTRAVENOUS; SUBCUTANEOUS at 12:10

## 2024-03-28 RX ADMIN — CEPHALEXIN 500 MG: 500 CAPSULE ORAL at 20:38

## 2024-03-28 RX ADMIN — FENTANYL CITRATE 100 MCG: 0.05 INJECTION, SOLUTION INTRAMUSCULAR; INTRAVENOUS at 10:25

## 2024-03-28 RX ADMIN — DROPERIDOL 0.62 MG: 2.5 INJECTION, SOLUTION INTRAMUSCULAR; INTRAVENOUS at 12:38

## 2024-03-28 RX ADMIN — NORTRIPTYLINE HYDROCHLORIDE 40 MG: 10 CAPSULE ORAL at 21:22

## 2024-03-28 RX ADMIN — Medication 200 MCG: at 09:32

## 2024-03-28 RX ADMIN — SODIUM CHLORIDE, POTASSIUM CHLORIDE, SODIUM LACTATE AND CALCIUM CHLORIDE 1000 ML: 600; 310; 30; 20 INJECTION, SOLUTION INTRAVENOUS at 07:16

## 2024-03-28 RX ADMIN — OXYCODONE HYDROCHLORIDE AND ACETAMINOPHEN 1 TABLET: 7.5; 325 TABLET ORAL at 15:42

## 2024-03-28 RX ADMIN — KETOROLAC TROMETHAMINE 15 MG: 15 INJECTION, SOLUTION INTRAMUSCULAR; INTRAVENOUS at 18:36

## 2024-03-28 RX ADMIN — HYDROMORPHONE HYDROCHLORIDE 0.5 MG: 1 INJECTION, SOLUTION INTRAMUSCULAR; INTRAVENOUS; SUBCUTANEOUS at 11:45

## 2024-03-28 RX ADMIN — PROPOFOL 140 MG: 10 INJECTION, EMULSION INTRAVENOUS at 08:53

## 2024-03-28 RX ADMIN — HYDROMORPHONE HYDROCHLORIDE 0.5 MG: 1 INJECTION, SOLUTION INTRAMUSCULAR; INTRAVENOUS; SUBCUTANEOUS at 12:52

## 2024-03-28 RX ADMIN — Medication 100 MCG: at 09:20

## 2024-03-28 RX ADMIN — CEPHALEXIN 500 MG: 500 CAPSULE ORAL at 15:42

## 2024-03-28 RX ADMIN — Medication 200 MCG: at 09:15

## 2024-03-28 RX ADMIN — GABAPENTIN 900 MG: 300 CAPSULE ORAL at 21:22

## 2024-03-28 RX ADMIN — EPHEDRINE SULFATE 20 MG: 50 INJECTION INTRAVENOUS at 09:06

## 2024-03-28 RX ADMIN — ACETAMINOPHEN 1000 MG: 500 TABLET, FILM COATED ORAL at 07:33

## 2024-03-28 RX ADMIN — FENTANYL CITRATE 50 MCG: 0.05 INJECTION, SOLUTION INTRAMUSCULAR; INTRAVENOUS at 11:14

## 2024-03-28 RX ADMIN — LIDOCAINE HYDROCHLORIDE 100 MG: 20 INJECTION, SOLUTION EPIDURAL; INFILTRATION; INTRACAUDAL; PERINEURAL at 08:53

## 2024-03-28 RX ADMIN — FENTANYL CITRATE 50 MCG: 50 INJECTION, SOLUTION INTRAMUSCULAR; INTRAVENOUS at 12:40

## 2024-03-28 RX ADMIN — Medication 200 MCG: at 09:25

## 2024-03-28 RX ADMIN — PHENYLEPHRINE HYDROCHLORIDE 0.3 MCG/KG/MIN: 10 INJECTION INTRAVENOUS at 09:48

## 2024-03-28 RX ADMIN — HYDROMORPHONE HYDROCHLORIDE 0.5 MG: 1 INJECTION, SOLUTION INTRAMUSCULAR; INTRAVENOUS; SUBCUTANEOUS at 11:57

## 2024-03-28 NOTE — PLAN OF CARE
Goal Outcome Evaluation:      Pt Aox4. VSS on RA. PPP.  Arrived to floor from PACU this afternoon. Dressing to lower back cdi. Reinforced d/t drainage in recovery per nurse. Spouse at bedside. Pt has LSO brace from previous surgery at bedside. Shaffer catheter in place, draining well. Denies n/t. Reports incisional pain 5/10, did not want medication yet. IVs flushed and patent.

## 2024-03-28 NOTE — BRIEF OP NOTE
LUMBAR LAMINECTOMY TRANSFORAMINAL LUMBAR INTERBODY FUSION L3/4 operative note     Jyoti Dick  3/28/2024    Pre-op Diagnosis:   Degenerative disc disease, lumbar [M51.36]  Lumbar radiculopathy [M54.16]       Post-Op Diagnosis Codes:     * Degenerative disc disease, lumbar [M51.36]     * Lumbar radiculopathy [M54.16]    Procedure/CPT® Codes:        Procedure(s):  LUMBAR LAMINECTOMY TRANSFORAMINAL LUMBAR INTERBODY FUSION L3-4 with revision of pedicle screws from L3-S1              Surgeon(s):  Shin Rojo DO    Anesthesia: General    Staff:   Circulator: Jacob Hall RN; Benitez Hairston RN  Scrub Person: Juani Meyer; Emily Mondragon; Joy Abreu; Mallika Eubanks  Vendor Representative: oJsiah Verde; Leah Whitaker MD         Estimated Blood Loss: minimal    Urine Voided: * No values recorded between 3/28/2024  8:49 AM and 3/28/2024 11:33 AM *    Specimens:                          Drains:   Urethral Catheter Silicone 16 Fr. (Active)   Daily Indications Selected surgeries ( tract, abdomen) 03/28/24 1315   Site Assessment Clean 03/28/24 1134   Collection Container Standard drainage bag 03/28/24 1315   Securement Method Securing device 03/28/24 1315   Output (mL) 875 mL 03/28/24 1315       Findings: Severe stenosis and scarring L3/4 with scoliotic deformity        Complications: None       was responsible for performing the following activities: Retraction, Suction, and Irrigation and their skilled assistance was necessary for the success of this case.    Shin Rojo DO     Date: 3/28/2024  Time: 13:37 CDT

## 2024-03-28 NOTE — ANESTHESIA PROCEDURE NOTES
Airway  Urgency: elective    Date/Time: 3/28/2024 8:54 AM  Airway not difficult    General Information and Staff    Patient location during procedure: OR  CRNA/CAA: Ronen Hicks CRNA    Indications and Patient Condition  Indications for airway management: airway protection    Preoxygenated: yes  Mask difficulty assessment: 1 - vent by mask    Final Airway Details  Final airway type: endotracheal airway      Successful airway: ETT  Cuffed: yes   Successful intubation technique: direct laryngoscopy  Endotracheal tube insertion site: oral  Blade: Mock  Blade size: 2  ETT size (mm): 7.0  Cormack-Lehane Classification: grade I - full view of glottis  Placement verified by: capnometry   Measured from: lips  ETT/EBT  to lips (cm): 21  Number of attempts at approach: 1  Assessment: lips, teeth, and gum same as pre-op and atraumatic intubation

## 2024-03-28 NOTE — OP NOTE
LUMBAR LAMINECTOMY TRANSFORAMINAL LUMBAR INTERBODY FUSION L3/4 operative note     Jyoti Dick  3/28/2024    Pre-op Diagnosis:   Degenerative disc disease, lumbar [M51.36]  Lumbar radiculopathy [M54.16]       Post-Op Diagnosis Codes:     * Degenerative disc disease, lumbar [M51.36]     * Lumbar radiculopathy [M54.16]    Procedure/CPT® Codes:        Procedure(s):  LUMBAR LAMINECTOMY TRANSFORAMINAL LUMBAR INTERBODY FUSION L3-4 with revision of pedicle screws from L3-S1        Spinal Surgery Levels Completed:1 Level      Surgeon(s):  Shin Rojo DO    Anesthesia: General    Staff:   Circulator: Jacob Hall RN; Benitez Hairston RN  Scrub Person: Juani Meyer; Emily Mondragon; Joy Abreu; Mallika Eubanks  Vendor Representative: Josiah Verde; Leah Whitaker MD         Estimated Blood Loss: minimal    Urine Voided: * No values recorded between 3/28/2024  8:49 AM and 3/28/2024 11:33 AM *    Specimens:                          Drains:   Urethral Catheter Silicone 16 Fr. (Active)   Daily Indications Selected surgeries ( tract, abdomen) 03/28/24 1315   Site Assessment Clean 03/28/24 1134   Collection Container Standard drainage bag 03/28/24 1315   Securement Method Securing device 03/28/24 1315   Output (mL) 875 mL 03/28/24 1315       Findings: Severe stenosis and scarring L3/4 with scoliotic deformity        Complications: None       was responsible for performing the following activities: Retraction, Suction, and Irrigation and their skilled assistance was necessary for the success of this case.    Procedure    Patient is a 58-year-old female who has had a two-level fusion from L4-S1 in the past and unfortunately has developed significant degenerative changes at L3/4 with a degenerative scoliosis and instability at L3/4 with severe stenosis.  Because just she had such bad leg pain and has failed conservative management I did offer her a transforaminal lumbar interbody fusion at L3/4 to  hopefully stabilize this level and correct some of her scoliotic deformity to hopefully prevent further degeneration above is much as possible.  I did explain to her the risks and benefits of this procedure and she wished to proceed.  Patient was brought to the operative suite and put under general anesthetic and then turned onto the Rupert table and padded at all the appropriate points.  We then were able to bring in AP and lateral fluoroscopy units and marker incision sites and then infiltrated with 1% lidocaine with epinephrine and opened with a 10 blade we then dissected with the Bovie cautery down to the lumbodorsal fascia and then used the trocars and multiple AP and lateral fluoroscopy images to place the L3 pedicle K wires.  Once the K wires were placed into the pedicles of L3 we were able to dissect down to her previous instrumentation and dissect the L4 screw.  We were able to cut the screws from her previous construct so that we could use these pedicles for her fusion at L3/4.  Once we cut the rods we were able to remove the screws at L4 and placed K wires in this level.  We then dissected with the quadrant dilator system over the L3/4 facet on the left and then brought in the intraoperative microscope for microdissection portion of the case.  We then used a high-speed drill to drill out the facet at L3/4 and expose the disc space at this level.  Once we exposed the disc space at this level we the scoliotic deformity an annulotomy and an aggressive discectomy.  We then used multiple different spacers to expand the left side of the L3/4 interspace given the fact that this was the side that needed to be expanded in order to correct the scoliotic deformity.  We then placed a Medtronic titanium expandable graft on the left side and expanded to maximal expansion which did a good job correcting the scoliotic deformity at this level.  I then was able to use the Kerrisons and the nerve hook to break up some of  the scarring and decompress the exiting L3 nerve root and the traversing L4 nerve root at this level and we did a good central decompression to hopefully give her more room.  We then packed the remaining interspace with graft on and master graft and once this was complete we then irrigated with copious amounts antibiotic saline got meticulous hemostasis with bipolar cautery.  We then removed the quadrant retractor and then placed pedicle screws using the Voyager system into L3 and L4 bilaterally and then placed a 35 mm roxann.  This was torqued to the appropriate torque and then we got final AP and lateral fluoroscopy images which showed good placement of all instrumentation and then irrigated with copious amounts antibiotic saline and got meticulous hemostasis once again and closed the fascia with 0 Nurolon and injected intramuscular Marcaine for postoperative pain control.  We then closed the skin with 2-0 Vicryl Mastisol Steri-Strips Telfa and Tegaderm.

## 2024-03-28 NOTE — H&P
"H&P    CC: Back and leg pain      HPI: Patient is a 58-year-old female who previously had an L4/5 and L5/S1 lumbar fusion and now has stenosis with instability at L3/4.  She is here today for a L3/4 transforaminal lumbar interbody fusion.    Review of Systems     Pertinent positives/negatives documented in HPI.  All other systems reviewed and negative.    Past Medical History:  has a past medical history of Cardiomyopathy, DM (diabetes mellitus), GERD (gastroesophageal reflux disease), HTN (hypertension), Hypercholesteremia, Rib fractures, and Right bundle branch block.    Past Surgical History:  has a past surgical history that includes Lumbar fusion (2018); Laminectomy (07/2023); and Hemorrhoid surgery.    Family History: family history is not on file.    Social History:  reports that she has never smoked. She has never used smokeless tobacco. She reports current alcohol use. She reports that she does not use drugs.    Allergies: Patient has no known allergies.    Medications: Scheduled Meds:ceFAZolin, 3,000 mg, Intravenous, Once  sodium chloride, 3 mL, Intravenous, Q12H      Continuous Infusions:lactated ringers, 1,000 mL, Last Rate: 1,000 mL (03/28/24 0716)  lactated ringers, 1,000 mL, Last Rate: 1,000 mL (03/28/24 0716)  lactated ringers, 100 mL/hr      PRN Meds:.  lidocaine PF 1%    midazolam    sodium chloride    sodium chloride    sodium chloride    sodium chloride     Objective:  Vital signs: (most recent): Blood pressure 161/83, pulse 67, temperature 97.7 °F (36.5 °C), temperature source Temporal, resp. rate 18, height 162 cm (63.78\"), weight 78 kg (171 lb 15.3 oz), SpO2 96%.        Neurologic Exam     Mental Status   Oriented to person, place, and time.   Attention: normal. Concentration: normal.   Speech: speech is normal   Level of consciousness: alert  Knowledge: good.   Normal comprehension.     Cranial Nerves   Cranial nerves II through XII intact.     Motor Exam     Strength   Strength 5/5 " throughout.     Sensory Exam   Light touch normal.     Gait, Coordination, and Reflexes     Gait  Gait: normal      Vital Signs  Temp:  [97.7 °F (36.5 °C)] 97.7 °F (36.5 °C)  Heart Rate:  [67-71] 67  Resp:  [16-18] 18  BP: (161)/(83) 161/83    Physical Exam  Neurological:      Mental Status: She is oriented to person, place, and time.      Cranial Nerves: Cranial nerves 2-12 are intact.      Motor: Motor strength is normal.     Gait: Gait is intact.   Psychiatric:         Speech: Speech normal.         Results Review:   I reviewed the patient's new clinical results.  I reviewed the patient's new imaging results and agree with the interpretation.  I reviewed the patient's other test results and agree with the interpretation          Lab Results (last 24 hours)       ** No results found for the last 24 hours. **              Assessment/Plan:   L3/4 stenosis with instability and radiculopathy with neurogenic claudication    Her for an L3/4 transforaminal lumbar interbody fusion with revision of posterior pedicle screw instrumentation.      Degenerative disc disease, lumbar    Lumbar radiculopathy      I discussed the patient's findings and my recommendations with patient    Shin DELCID DO Darrel  03/28/24  08:17 CDT    I spent 30 minutes caring for Jyoti on this date of service. This time includes time spent by me in the following activities: preparing for the visit, reviewing tests, and obtaining and/or reviewing a separately obtained history

## 2024-03-29 VITALS
RESPIRATION RATE: 16 BRPM | DIASTOLIC BLOOD PRESSURE: 62 MMHG | BODY MASS INDEX: 29.36 KG/M2 | TEMPERATURE: 98.2 F | WEIGHT: 171.96 LBS | SYSTOLIC BLOOD PRESSURE: 145 MMHG | HEART RATE: 95 BPM | OXYGEN SATURATION: 100 % | HEIGHT: 64 IN

## 2024-03-29 PROCEDURE — 97165 OT EVAL LOW COMPLEX 30 MIN: CPT

## 2024-03-29 PROCEDURE — 97161 PT EVAL LOW COMPLEX 20 MIN: CPT

## 2024-03-29 PROCEDURE — 25010000002 KETOROLAC TROMETHAMINE PER 15 MG: Performed by: NEUROLOGICAL SURGERY

## 2024-03-29 PROCEDURE — G0378 HOSPITAL OBSERVATION PER HR: HCPCS

## 2024-03-29 PROCEDURE — 99024 POSTOP FOLLOW-UP VISIT: CPT | Performed by: NEUROLOGICAL SURGERY

## 2024-03-29 RX ORDER — NALOXONE HYDROCHLORIDE 4 MG/.1ML
SPRAY NASAL
Qty: 2 EACH | Refills: 0 | Status: SHIPPED | OUTPATIENT
Start: 2024-03-29

## 2024-03-29 RX ORDER — CYCLOBENZAPRINE HCL 10 MG
10 TABLET ORAL 3 TIMES DAILY PRN
Qty: 18 TABLET | Refills: 0 | Status: SHIPPED | OUTPATIENT
Start: 2024-03-29 | End: 2024-04-04

## 2024-03-29 RX ORDER — OXYCODONE AND ACETAMINOPHEN 7.5; 325 MG/1; MG/1
1 TABLET ORAL EVERY 6 HOURS PRN
Qty: 24 TABLET | Refills: 0 | Status: SHIPPED | OUTPATIENT
Start: 2024-03-29 | End: 2024-04-04

## 2024-03-29 RX ORDER — KETOROLAC TROMETHAMINE 10 MG/1
10 TABLET, FILM COATED ORAL EVERY 6 HOURS PRN
Qty: 12 TABLET | Refills: 0 | Status: SHIPPED | OUTPATIENT
Start: 2024-03-29

## 2024-03-29 RX ADMIN — OXYCODONE HYDROCHLORIDE AND ACETAMINOPHEN 1 TABLET: 7.5; 325 TABLET ORAL at 06:24

## 2024-03-29 RX ADMIN — OXYCODONE HYDROCHLORIDE AND ACETAMINOPHEN 1 TABLET: 7.5; 325 TABLET ORAL at 00:01

## 2024-03-29 RX ADMIN — CEPHALEXIN 500 MG: 500 CAPSULE ORAL at 08:58

## 2024-03-29 RX ADMIN — KETOROLAC TROMETHAMINE 15 MG: 15 INJECTION, SOLUTION INTRAMUSCULAR; INTRAVENOUS at 08:58

## 2024-03-29 RX ADMIN — CEPHALEXIN 500 MG: 500 CAPSULE ORAL at 03:28

## 2024-03-29 RX ADMIN — GABAPENTIN 900 MG: 300 CAPSULE ORAL at 08:58

## 2024-03-29 RX ADMIN — GABAPENTIN 900 MG: 300 CAPSULE ORAL at 15:26

## 2024-03-29 RX ADMIN — KETOROLAC TROMETHAMINE 15 MG: 15 INJECTION, SOLUTION INTRAMUSCULAR; INTRAVENOUS at 15:25

## 2024-03-29 RX ADMIN — CYCLOBENZAPRINE HYDROCHLORIDE 10 MG: 10 TABLET, FILM COATED ORAL at 08:58

## 2024-03-29 RX ADMIN — OXYCODONE HYDROCHLORIDE AND ACETAMINOPHEN 1 TABLET: 7.5; 325 TABLET ORAL at 12:56

## 2024-03-29 NOTE — THERAPY DISCHARGE NOTE
Patient Name: Jyoti Dick  : 1965    MRN: [id number]                              Today's Date: 3/29/2024       Admit Date: 3/28/2024    Visit Dx:     ICD-10-CM ICD-9-CM   1. Impaired functional mobility and activity tolerance [Z74.09]  Z74.09 V49.89   2. Degenerative disc disease, lumbar  M51.36 722.52   3. Lumbar radiculopathy  M54.16 724.4     Patient Active Problem List   Diagnosis    Degenerative disc disease, lumbar    Lumbar radiculopathy    Spondylolisthesis     Past Medical History:   Diagnosis Date    Cardiomyopathy     DM (diabetes mellitus)     GERD (gastroesophageal reflux disease)     HTN (hypertension)     Hypercholesteremia     Rib fractures     right side    Right bundle branch block      Past Surgical History:   Procedure Laterality Date    HEMORRHOIDECTOMY      LAMINECTOMY  2023    LUMBAR FUSION  2018    LUMBAR LAMINECTOMY WITH FUSION Bilateral 3/28/2024    Procedure: LUMBAR LAMINECTOMY TRANSFORAMINAL LUMBAR INTERBODY FUSION L3-4 with revision of pedicle screws from L3-S1;  Surgeon: Shin Rojo DO;  Location: [facility];  Service: Neurosurgery;  Laterality: Bilateral;      General Information       Row Name 24 0842          Physical Therapy Time and Intention    Document Type discharge evaluation/summary  DDD lumbar, LBP, LLE pain n/t, Lumbar laminectomy TLIF L3-4  -IRAIDA (robbie) TARUN (darryl) IRAIDA (c)     Mode of Treatment co-treatment;physical therapy  -IRAIDA (robbie) TARUN (darryl) IRAIDA (c)       Row Name 24 0842          General Information    Patient Profile Reviewed yes  -IRAIDA (robbie) TARUN (darryl) IRAIDA (alejandro)     Prior Level of Function independent:;all household mobility;community mobility;ADL's;driving  rollator for home distance, straight cane for community distance  -IRAIDA (robbie) TARUN (darryl) IRAIDA (c)     Existing Precautions/Restrictions fall;spinal;LSO;brace worn when out of bed  -IRAIDA (robbie) TARUN (t) IRAIDA (c)     Barriers to Rehab physical barrier;impaired sensation  -IRAIDA (robbie) TAURN (darryl) IRAIDA (c)       Row Name 24 0842           Living Environment    People in Home spouse  -JE (r) TARUN (t) JE (c)       Row Name 03/29/24 0842          Home Main Entrance    Number of Stairs, Main Entrance three  -JE (r) TARUN (t) JE (c)     Stair Railings, Main Entrance railing on left side (ascending)  -JE (r) TARUN (t) JE (c)       Row Name 03/29/24 0842          Stairs Within Home, Primary    Number of Stairs, Within Home, Primary none  -JE (r) TARUN (t) JE (c)       Row Name 03/29/24 0842          Cognition    Orientation Status (Cognition) oriented x 4  -JE (r) TARUN (t) JE (c)       Row Name 03/29/24 0842          Safety Issues, Functional Mobility    Impairments Affecting Function (Mobility) pain;strength;range of motion (ROM);sensation/sensory awareness  -JE (r) TARUN (t) JE (c)               User Key  (r) = Recorded By, (t) = Taken By, (c) = Cosigned By      Initials Name Provider Type    Muriel Solis, PT Physical Therapist    Erlin Alonso, PT Student PT Student                   Mobility       Row Name 03/29/24 0842          Bed Mobility    Bed Mobility rolling left;sidelying-sit  -JE (r) TARUN (t) JE (c)     Rolling Left [place] (Bed Mobility) standby assist  -JE (r) TARUN (t) IRAIDA (c)     Sidelying-Sit [place] (Bed Mobility) standby assist  -IRAIDA (r) TARUN (t) IRAIDA (c)     Assistive Device (Bed Mobility) bed rails;head of bed elevated  -JE (r) TARUN (t) IRAIDA (c)       Row Name 03/29/24 0842          Bed-Chair Transfer    Bed-Chair [place] (Transfers) standby assist  -JE (r) TARUN (t) IRAIDA (c)     Assistive Device (Bed-Chair Transfers) walker, front-wheeled  -JE (r) TARUN (t) JE (c)       Row Name 03/29/24 0842          Sit-Stand Transfer    Sit-Stand [place] (Transfers) contact guard;verbal cues  -JE (r) TARUN (t) IRAIDA (c)     Assistive Device (Sit-Stand Transfers) walker, front-wheeled  -JE (r) TARUN (t) JE (c)       Row Name 03/29/24 0842          Gait/Stairs (Locomotion)    [place] Level (Gait) standby assist  -IRAIDA (r) TARUN (t) IRAIDA (c)     Assistive Device  (Gait) walker, front-wheeled  -JE (r) AJ (t) JE (c)     Patient was able to Ambulate yes  -JE (r) AJ (t) JE (c)     Distance in Feet (Gait) 180  -JE (r) AJ (t) JE (c)     Deviations/Abnormal Patterns (Gait) gait speed decreased  -JE (r) AJ (t) JE (c)     Bilateral Gait Deviations forward flexed posture  -JE (r) AJ (t) JE (c)               User Key  (r) = Recorded By, (t) = Taken By, (c) = Cosigned By      Initials Name Provider Type    Muriel Solis, PT Physical Therapist    Erlin Alonso, PT Student PT Student                   Obj/Interventions       Row Name 03/29/24 0842          Range of Motion Comprehensive    General Range of Motion bilateral upper extremity ROM WFL;lower extremity range of motion deficits identified  -JE (r) AJ (t) JE (c)     Comment, General Range of Motion B knee ext 10% limited  -JE (r) AJ (t) JE (c)       Row Name 03/29/24 0842          Strength Comprehensive (MMT)    General Manual Muscle Testing (MMT) Assessment lower extremity strength deficits identified  -JE (r) AJ (t) JE (c)     Comment, General Manual Muscle Testing (MMT) Assessment LLE 3+/5, RLE 4/5  -JE (r) AJ (t) JE (c)       Row Name 03/29/24 0842          Balance    Balance Assessment sitting static balance;sitting dynamic balance;sit to stand dynamic balance;standing static balance;standing dynamic balance  -JE (r) AJ (t) JE (c)     Static Sitting Balance independent  -JE (r) AJ (t) JE (c)     Dynamic Sitting Balance independent  -JE (r) AJ (t) JE (c)     Position, Sitting Balance unsupported;sitting edge of bed  -JE (r) AJ (t) JE (c)     Sit to Stand Dynamic Balance contact guard;verbal cues  -JE (r) AJ (t) JE (c)     Static Standing Balance standby assist  -JE (r) AJ (t) JE (c)     Dynamic Standing Balance standby assist  -JE (r) AJ (t) JE (c)     Position/Device Used, Standing Balance supported;walker, front-wheeled  -JE (r) AJ (t) JE (c)     Balance Interventions sitting;standing;sit to  stand;supported;static;dynamic  -JE (r) TARUN (t) IRAIDA (c)       Row Name 03/29/24 0842          Sensory Assessment (Somatosensory)    Sensory Assessment (Somatosensory) left LE  -JE (r) TARUN (t) IRAIDA (c)     Left LE Sensory Assessment light touch awareness  L L4 and L5 light touch deficit  -JE (r) TARUN (t) IRAIDA (c)               User Key  (r) = Recorded By, (t) = Taken By, (c) = Cosigned By      Initials Name Provider Type    Muriel Solis, PT Physical Therapist    Erlin Alonso, PT Student PT Student                   Goals/Plan    No documentation.                  Clinical Impression       Row Name 03/29/24 0842          Pain    Pretreatment Pain Rating 7/10  -JE (r) TARUN (t) IRAIDA (c)     Posttreatment Pain Rating 5/10  -IRAIDA (r) TARUN (t) IRAIDA (c)     Pain Location incisional  -IRAIDA (r) TARUN (t) IRAIDA (c)     Pain Location - back  -IRAIDA (r) TARUN (t) IRAIDA (c)     Pain Intervention(s) Medication (See MAR);Repositioned  -IRAIDA (r) TARUN (t) IRAIDA (c)       Row Name 03/29/24 0842          Plan of Care Review    Plan of Care Reviewed With patient  -IRAIDA (r) TARUN (t) IRAIDA (c)     Progress improving  -IRAIDA (r) TARUN (t) IRAIDA (c)     Outcome Evaluation Patient presented alert and Ox4 laying supine w/ HOB elevated. Pt c/o 7/10 pain in lower back at resting. Spinal precautions and LSO brace education was reviewed w/ success. Pt reports being completely I w/ ADLs/IADLs at PLOF. Pt sat EOB I and and donned LSO w/ vc. Pt LLE is demos mod weakness compares to RLE. She also has light touch deficit of L4 and L5 dermatomal pattern that is not new. Pt performed STS w/ CGA and walker and ambulated in woods w/ SBA. Vc to relax shoulders while ambulating and demos no LOB. Pt shows no need for skilled PT at this time. Recommend home w/ assist upon dc.  -IRAIDA (r) TARUN (t) IRAIDA (c)       Row Name 03/29/24 0842          Therapy Assessment/Plan (PT)    Patient/Family Therapy Goals Statement (PT) go home  -IRAIDA (r) TARUN (t) IRAIDA (c)     Criteria for Skilled Interventions Met (PT) no problems  identified which require skilled intervention  -JE (r) AJ (t) JE (c)     Therapy Frequency (PT) evaluation only  -JE (r) AJ (t) JE (c)       Row Name 03/29/24 0842          Vital Signs    Pre SpO2 (%) 96  -JE (r) AJ (t) JE (c)     O2 Delivery Pre Treatment room air  -JE (r) AJ (t) JE (c)     Post SpO2 (%) 96  -JE (r) AJ (t) JE (c)     O2 Delivery Post Treatment room air  -JE (r) AJ (t) JE (c)     Pre Patient Position Supine  -JE (r) AJ (t) JE (c)     Intra Patient Position Standing  -JE (r) AJ (t) JE (c)     Post Patient Position Sitting  -JE (r) AJ (t) JE (c)       Row Name 03/29/24 0842          Positioning and Restraints    Pre-Treatment Position in bed  -JE (r) AJ (t) JE (c)     Post Treatment Position chair  -JE (r) AJ (t) JE (c)     In Chair sitting;call light within reach;encouraged to call for assist;with family/caregiver  -JE (r) AJ (t) JE (c)               User Key  (r) = Recorded By, (t) = Taken By, (c) = Cosigned By      Initials Name Provider Type    Muriel Solis, PT Physical Therapist    Erlin Alonso, PT Student PT Student                   Outcome Measures       Row Name 03/29/24 0858 03/29/24 0842       How much help from another person do you currently need...    Turning from your back to your side while in flat bed without using bedrails? 4  -KH 4  -JE (r) AJ (t) JE (c)    Moving from lying on back to sitting on the side of a flat bed without bedrails? 4  -KH 4  -JE (r) AJ (t) JE (c)    Moving to and from a bed to a chair (including a wheelchair)? 3  -KH 3  -JE (r) AJ (t) JE (c)    Standing up from a chair using your arms (e.g., wheelchair, bedside chair)? 4  -KH 4  -JE (r) AJ (t) JE (c)    Climbing 3-5 steps with a railing? 3  -KH 3  -JE (r) AJ (t) JE (c)    To walk in hospital room? 4  -KH 4  -IRAIDA (r) TARUN (t) IRAIDA (c)    AM-PAC 6 Clicks Score (PT) 22  - 22  -IRAIDA (robbie) TARUN (t)    Highest Level of Mobility Goal 7 --> Walk 25 feet or more  - 7 --> Walk 25 feet or more  -IRAIDA (r) TARUN (t)      Row  Name 03/29/24 0842 03/29/24 0831       Functional Assessment    Outcome Measure Options AM-PAC 6 Clicks Basic Mobility (PT)  -IRAIDA (r) TARUN (t) JE (c) AM-PAC 6 Clicks Daily Activity (OT)  -LS              User Key  (r) = Recorded By, (t) = Taken By, (c) = Cosigned By      Initials Name Provider Type    Muriel Solis, PT Physical Therapist    Serena French, RN Registered Nurse    Jess Burks, OTR/L Occupational Therapist    Erlin Alonso, PT Student PT Student                  Physical Therapy Education       Title: PT OT SLP Therapies (In Progress)       Topic: Physical Therapy (In Progress)       Point: Mobility training (Done)       Learning Progress Summary             Patient Acceptance, E, VU by  at 3/29/2024 1014    Comment: PT role in care, spinal precautions, LSO brace   Significant Other Acceptance, E, VU by TARUN at 3/29/2024 1014    Comment: PT role in care, spinal precautions, LSO brace                         Point: Home exercise program (Not Started)       Learner Progress:  Not documented in this visit.              Point: Body mechanics (Done)       Learning Progress Summary             Patient Acceptance, E, VU by TARUN at 3/29/2024 1014    Comment: PT role in care, spinal precautions, LSO brace   Significant Other Acceptance, E, VU by TARUN at 3/29/2024 1014    Comment: PT role in care, spinal precautions, LSO brace                         Point: Precautions (Done)       Learning Progress Summary             Patient Acceptance, E, VU by  at 3/29/2024 1014    Comment: PT role in care, spinal precautions, LSO brace   Significant Other Acceptance, E, VU by TARUN at 3/29/2024 1014    Comment: PT role in care, spinal precautions, LSO brace                                         User Key       Initials Effective Dates Name Provider Type Discipline     12/14/23 -  Erlin Emery, PT Student PT Student PT                  PT Recommendation and Plan     Plan of Care Reviewed With: patient  Progress:  improving  Outcome Evaluation: Patient presented alert and Ox4 laying supine w/ HOB elevated. Pt c/o 7/10 pain in lower back at resting. Spinal precautions and LSO brace education was reviewed w/ success. Pt reports being completely I w/ ADLs/IADLs at PLOF. Pt sat EOB I and and donned LSO w/ vc. Pt LLE is demos mod weakness compares to RLE. She also has light touch deficit of L4 and L5 dermatomal pattern that is not new. Pt performed STS w/ CGA and walker and ambulated in woods w/ SBA. Vc to relax shoulders while ambulating and demos no LOB. Pt shows no need for skilled PT at this time. Recommend home w/ assist upon dc.     Time Calculation:         PT Charges       Row Name 03/29/24 0842             Time Calculation    Start Time 0842  10min chart review  -JE (r) AJ (t) JE (c)      Stop Time 0915  -JE (r) AJ (t) JE (c)      Time Calculation (min) 33 min  -JE (r) AJ (t)      PT Received On 03/29/24  -JE (r) AJ (t) JE (c)         Untimed Charges    PT Eval/Re-eval Minutes 43  -JE (r) AJ (t) JE (c)         Total Minutes    Untimed Charges Total Minutes 43  -JE (r) AJ (t)       Total Minutes 43  -JE (r) AJ (t)                User Key  (r) = Recorded By, (t) = Taken By, (c) = Cosigned By      Initials Name Provider Type    Muriel Solis, PT Physical Therapist    Erlin Alonso, PT Student PT Student                      PT G-Codes  Outcome Measure Options: AM-PAC 6 Clicks Basic Mobility (PT)  AM-PAC 6 Clicks Score (PT): 22  AM-PAC 6 Clicks Score (OT): 19    PT Discharge Summary  Anticipated Discharge Disposition (PT): home with assist    Erlin Emery PT Student  3/29/2024

## 2024-03-29 NOTE — PLAN OF CARE
Problem: Adult Inpatient Plan of Care  Goal: Plan of Care Review  Outcome: Ongoing, Progressing  Flowsheets (Taken 3/29/2024 7213)  Progress: no change  Plan of Care Reviewed With: patient  Outcome Evaluation: Pt is A&Ox4. PRn pain meds given with some relief noted. Dressing to back has had moderate amount of drainage. Icepack as needed. VSS. Safety maintained.

## 2024-03-29 NOTE — PROGRESS NOTES
"Jyoti Dick  58 y.o.      Chief complaint:   Back and leg pain    Subjective  Patient is doing fairly well today although she has not been out of bed since surgery and we will see how she does with physical therapy today.    Temp:  [97.6 °F (36.4 °C)-98 °F (36.7 °C)] 98 °F (36.7 °C)  Heart Rate:  [59-75] 75  Resp:  [8-18] 16  BP: (123-161)/(56-83) 127/61      Objective:  Vital signs: (most recent): Blood pressure 127/61, pulse 75, temperature 98 °F (36.7 °C), temperature source Oral, resp. rate 16, height 162 cm (63.78\"), weight 78 kg (171 lb 15.3 oz), SpO2 94%.        Neurologic Exam     Mental Status   Oriented to person, place, and time.   Attention: normal. Concentration: normal.   Speech: speech is normal   Level of consciousness: alert  Knowledge: good.   Normal comprehension.     Cranial Nerves   Cranial nerves II through XII intact.     Motor Exam     Strength   Strength 5/5 throughout.     Sensory Exam   Light touch normal.     Gait, Coordination, and Reflexes     Gait  Gait: normal      Lab Results (last 24 hours)       Procedure Component Value Units Date/Time    POC Glucose Once [[id number]]  (Abnormal) Collected: 03/28/24 1323    Specimen: Blood Updated: 03/28/24 1334     Glucose 139 mg/dL      Comment: : [id number] Laurent Hernandez  SMeter ID: [id number]       POC Glucose Once [[id number]]  (Abnormal) Collected: 03/28/24 1138    Specimen: Blood Updated: 03/28/24 1151     Glucose 154 mg/dL      Comment: : [id number] Margo AmanrosendoMeter ID: [id number]                   Plan:   Status post transforaminal lumbar interbody fusion L3/4    We will increase activity and see how she does with ambulation today.      Lumbar radiculopathy    Degenerative disc disease, lumbar    Spondylolisthesis        Shin Rojo, DO  "

## 2024-03-29 NOTE — PLAN OF CARE
Goal Outcome Evaluation:    Pt Aox4. VSS on RA. PPP. Pain managed with prn medication. Pt tolerated therapy well. Up standby to brm with LSO brace. Spouse at bedside assisting. Shaffer removed this morning, pt now voiding well. Safety maintained.

## 2024-03-29 NOTE — PLAN OF CARE
Goal Outcome Evaluation:  Plan of Care Reviewed With: patient        Progress: no change  Outcome Evaluation: OT eval completed. Pt in fowlers upon therapist arrival; A&Ox4; c/o 7/10 back pain at rest. Pt reports Mod I-I with all BADLs including fxl ambulation at [facility]. Today, Pt educated on spinal precautions, donning/doffing of LSO, and LSO wear schedule; Pt verbalized good understanding. Pt performed log roll onto R side followed by sidelying>sit with SBA. Pt required Mod A for LB dressing and Min A for donning  of LSO while seated/standing as appropriate. Pt performed all sit<>stand transfers and fxl ambulation with CGA/SBA utilizing rwx. Pt performed all toileting tasks with CGA/SBA. Pt demos good understanding of all education provided and demos good safety awareness. Pt will be discharging home with assist from her  on this date.      Anticipated Discharge Disposition (OT): home with assist

## 2024-03-29 NOTE — ANESTHESIA POSTPROCEDURE EVALUATION
"Patient: Jyoti Dick    Procedure Summary       Date: 03/28/24 Room / Location:  [facility] OR  /  [facility]    Anesthesia Start: 0849 Anesthesia Stop: 1140    Procedure: LUMBAR LAMINECTOMY TRANSFORAMINAL LUMBAR INTERBODY FUSION L3-4 with revision of pedicle screws from L3-S1 (Bilateral: Spine Lumbar) Diagnosis:       Degenerative disc disease, lumbar      Lumbar radiculopathy      (Degenerative disc disease, lumbar [M51.36])      (Lumbar radiculopathy [M54.16])    Surgeons: Shin Rojo DO Provider: Ronen Hicks CRNA    Anesthesia Type: general ASA Status: 2            Anesthesia Type: general    Vitals  Vitals Value Taken Time   /70 03/28/24 1400   Temp 97.7 °F (36.5 °C) 03/28/24 1345   Pulse 71 03/28/24 1409   Resp 10 03/28/24 1400   SpO2 92 % 03/28/24 1409   Vitals shown include unfiled device data.        Post Anesthesia Care and Evaluation    Patient location during evaluation: PACU  Patient participation: complete - patient participated  Level of consciousness: awake and alert  Pain management: adequate    Airway patency: patent  Anesthetic complications: No anesthetic complications    Cardiovascular status: acceptable  Respiratory status: acceptable  Hydration status: acceptable    Comments: Blood pressure 127/61, pulse 75, temperature 98 °F (36.7 °C), temperature source Oral, resp. rate 16, height 162 cm (63.78\"), weight 78 kg (171 lb 15.3 oz), SpO2 94%.    Pt discharged from PACU based on tita score >8    "

## 2024-03-29 NOTE — THERAPY DISCHARGE NOTE
Acute Care - Occupational Therapy Discharge  [facility]    Patient Name: Jyoti Dick  : 1965    MRN: [id number]                              Today's Date: 3/29/2024       Admit Date: 3/28/2024    Visit Dx:     ICD-10-CM ICD-9-CM   1. Degenerative disc disease, lumbar  M51.36 722.52   2. Lumbar radiculopathy  M54.16 724.4     Patient Active Problem List   Diagnosis    Degenerative disc disease, lumbar    Lumbar radiculopathy    Spondylolisthesis     Past Medical History:   Diagnosis Date    Cardiomyopathy     DM (diabetes mellitus)     GERD (gastroesophageal reflux disease)     HTN (hypertension)     Hypercholesteremia     Rib fractures     right side    Right bundle branch block      Past Surgical History:   Procedure Laterality Date    HEMORRHOIDECTOMY      LAMINECTOMY  2023    LUMBAR FUSION  2018    LUMBAR LAMINECTOMY WITH FUSION Bilateral 3/28/2024    Procedure: LUMBAR LAMINECTOMY TRANSFORAMINAL LUMBAR INTERBODY FUSION L3-4 with revision of pedicle screws from L3-S1;  Surgeon: Shin Rojo DO;  Location: [facility];  Service: Neurosurgery;  Laterality: Bilateral;      General Information       Row Name 24 0831          OT Time and Intention    Document Type evaluation  cc: back and leg pain. Dx: L3/4 stenosis with instability and radiculopathy with neurogenic claudication. Pt now s/p Lumbar laminectomy and TLIF L3-4 with revision of pedicle screws from L3-S1 on 3/28  -     Mode of Treatment occupational therapy  -       Row Name 24 0831          General Information    Patient Profile Reviewed yes  -LS     Prior Level of Function independent:;all household mobility;community mobility;ADL's;home management;cooking;cleaning;driving;shopping  rollator for home distances, [place] for community distances  -     Existing Precautions/Restrictions fall;spinal;LSO;brace worn when out of bed  -       Row Name 24 0831          Occupational Profile    Environmental Supports and  Barriers (Occupational Profile) Walk in shower with shower chair. Sleeps in a recliner. Other AD/DME: rollator, [place]  -       Row Name 03/29/24 0831          Living Environment    People in Home spouse  -       Row Name 03/29/24 0831          Home Main Entrance    Number of Stairs, Main Entrance three  -LS     Stair Railings, Main Entrance railing on left side (ascending)  -       Row Name 03/29/24 0831          Stairs Within Home, Primary    Number of Stairs, Within Home, Primary none  flight of stairs to basement, but Pt has access to all needs on main level  -       Row Name 03/29/24 0831          Cognition    Orientation Status (Cognition) oriented x 4  -       Row Name 03/29/24 0831          Safety Issues, Functional Mobility    Impairments Affecting Function (Mobility) pain;strength  -               User Key  (r) = Recorded By, (t) = Taken By, (c) = Cosigned By      Initials Name Provider Type    Jess Burks OTR/L Occupational Therapist                   Mobility/ADL's       Row Name 03/29/24 0831          Bed Mobility    Bed Mobility rolling left;sidelying-sit  -LS     Rolling Left [place] (Bed Mobility) standby assist  -LS     Sidelying-Sit [place] (Bed Mobility) standby assist  -     Assistive Device (Bed Mobility) bed rails;head of bed elevated  -       Row Name 03/29/24 0831          Transfers    Transfers sit-stand transfer;stand-sit transfer;toilet transfer  -       Row Name 03/29/24 0831          Sit-Stand Transfer    Sit-Stand [place] (Transfers) contact guard;verbal cues  -     Assistive Device (Sit-Stand Transfers) walker, front-wheeled  -       Row Name 03/29/24 0831          Stand-Sit Transfer    Stand-Sit [place] (Transfers) contact guard;verbal cues  -     Assistive Device (Stand-Sit Transfers) walker, front-wheeled  -       Row Name 03/29/24 0831          Toilet Transfer    Type (Toilet Transfer) sit-stand;stand-sit  -     [place] Level  (Toilet Transfer) contact guard;verbal cues  -     Assistive Device (Toilet Transfer) grab bars/safety frame;walker, front-wheeled  -LS       Row Name 03/29/24 0831          Functional Mobility    Functional Mobility- Ind. Level contact guard assist  -LS     Functional Mobility- Device walker, front-wheeled  -LS     Patient was able to Ambulate yes  -       Row Name 03/29/24 0831          Activities of Daily Living    BADL Assessment/Intervention upper body dressing;lower body dressing;toileting  -       Row Name 03/29/24 0831          Upper Body Dressing Assessment/Training    [place] Level (Upper Body Dressing) upper body dressing skills;minimum assist (75% patient effort)  -LS     Position (Upper Body Dressing) edge of bed sitting  -       Row Name 03/29/24 0831          Lower Body Dressing Assessment/Training    [place] Level (Lower Body Dressing) lower body dressing skills;moderate assist (50% patient effort)  -LS     Position (Lower Body Dressing) unsupported sitting;supported standing  -       Row Name 03/29/24 0831          Toileting Assessment/Training    [place] Level (Toileting) toileting skills;contact guard assist  -LS     Position (Toileting) unsupported sitting;supported standing  -               User Key  (r) = Recorded By, (t) = Taken By, (c) = Cosigned By      Initials Name Provider Type    Jess Burks OTR/L Occupational Therapist                   Obj/Interventions       Row Name 03/29/24 0831          Sensory Assessment (Somatosensory)    Sensory Assessment (Somatosensory) UE sensation intact  -[facility] Name 03/29/24 0831          Vision Assessment/Intervention    Visual Impairment/Limitations WFL;corrective lenses full-time  -       Row Name 03/29/24 0831          Range of Motion Comprehensive    General Range of Motion bilateral upper extremity ROM WFL  -       Row Name 03/29/24 0831          Strength Comprehensive (MMT)    General Manual Muscle Testing  (MMT) Assessment --  -LS     Comment, General Manual Muscle Testing (MMT) Assessment BUE strength grossly 4+/5  -LS       Row Name 03/29/24 0831          Balance    Balance Assessment sitting static balance;sitting dynamic balance;standing static balance;standing dynamic balance  -LS     Static Sitting Balance independent  -LS     Dynamic Sitting Balance independent  -LS     Position, Sitting Balance sitting edge of bed;unsupported  -LS     Static Standing Balance contact guard  -LS     Dynamic Standing Balance contact guard  -LS     Position/Device Used, Standing Balance supported;walker, front-wheeled  -LS               User Key  (r) = Recorded By, (t) = Taken By, (c) = Cosigned By      Initials Name Provider Type    LS Jess Coleman, OTR/L Occupational Therapist                   Goals/Plan    No documentation.                  Clinical Impression       Row Name 03/29/24 0831          Pain Assessment    Pretreatment Pain Rating 7/10  -LS     Posttreatment Pain Rating 5/10  -LS     Pain Location - back  -LS     Pain Intervention(s) Medication (See MAR);Repositioned;Ambulation/increased activity;Nursing Notified  -       Row Name 03/29/24 0831          Plan of Care Review    Plan of Care Reviewed With patient  -LS     Progress no change  -     Outcome Evaluation OT eval completed. Pt in fowlers upon therapist arrival; A&Ox4; c/o 7/10 back pain at rest. Pt reports Mod I-I with all BADLs including fxl ambulation at [facility]. Today, Pt educated on spinal precautions, donning/doffing of LSO, and LSO wear schedule; Pt verbalized good understanding. Pt performed log roll onto R side followed by sidelying>sit with SBA. Pt required Mod A for LB dressing and Min A for donning  of LSO while seated/standing as appropriate. Pt performed all sit<>stand transfers and fxl ambulation with CGA/SBA utilizing rwx. Pt performed all toileting tasks with CGA/SBA. Pt demos good understanding of all education provided and demos good  safety awareness. Pt will be discharging home with assist from her  on this date.  -LS       Row Name 03/29/24 0831          Therapy Assessment/Plan (OT)    Rehab Potential (OT) --  -LS     Criteria for Skilled Therapeutic Interventions Met (OT) other (see comments)  discharging home this date  -LS     Therapy Frequency (OT) evaluation only  -LS       Row Name 03/29/24 0831          Therapy Plan Review/Discharge Plan (OT)    Anticipated Discharge Disposition (OT) home with assist  -LS       Row Name 03/29/24 0831          Positioning and Restraints    Pre-Treatment Position in bed  -LS     Post Treatment Position chair  -LS     In Chair sitting;call light within reach;encouraged to call for assist  -LS               User Key  (r) = Recorded By, (t) = Taken By, (c) = Cosigned By      Initials Name Provider Type    Jess Burks OTR/L Occupational Therapist                   Outcome Measures       Row Name 03/29/24 0831          How much help from another is currently needed...    Putting on and taking off regular lower body clothing? 2  -LS     Bathing (including washing, rinsing, and drying) 3  -LS     Toileting (which includes using toilet bed pan or urinal) 3  -LS     Putting on and taking off regular upper body clothing 3  -LS     Taking care of personal grooming (such as brushing teeth) 4  -LS     Eating meals 4  -LS     AM-PAC 6 Clicks Score (OT) 19  -LS       Row Name 03/29/24 0831          Functional Assessment    Outcome Measure Options AM-PAC 6 Clicks Daily Activity (OT)  -LS               User Key  (r) = Recorded By, (t) = Taken By, (c) = Cosigned By      Initials Name Provider Type    Jess Burks OTR/L Occupational Therapist                  Occupational Therapy Education       Title: PT OT SLP Therapies (In Progress)       Topic: Occupational Therapy (In Progress)       Point: ADL training (Done)       Description:   Instruct learner(s) on proper safety adaptation and remediation  techniques during self care or transfers.   Instruct in proper use of assistive devices.                  Learning Progress Summary             Patient Acceptance, E, VU by  at 3/29/2024 0926                         Point: Home exercise program (Not Started)       Description:   Instruct learner(s) on appropriate technique for monitoring, assisting and/or progressing therapeutic exercises/activities.                  Learner Progress:  Not documented in this visit.              Point: Precautions (Done)       Description:   Instruct learner(s) on prescribed precautions during self-care and functional transfers.                  Learning Progress Summary             Patient Acceptance, E, VU by  at 3/29/2024 0926                         Point: Body mechanics (Done)       Description:   Instruct learner(s) on proper positioning and spine alignment during self-care, functional mobility activities and/or exercises.                  Learning Progress Summary             Patient Acceptance, E, VU by  at 3/29/2024 0926                                         User Key       Initials Effective Dates Name Provider Type Discipline     06/20/22 -  Jess Coleman, OTR/L Occupational Therapist OT                  OT Recommendation and Plan  Therapy Frequency (OT): evaluation only  Plan of Care Review  Plan of Care Reviewed With: patient  Progress: no change  Outcome Evaluation: OT eval completed. Pt in fowlers upon therapist arrival; A&Ox4; c/o 7/10 back pain at rest. Pt reports Mod I-I with all BADLs including fxl ambulation at [facility]. Today, Pt educated on spinal precautions, donning/doffing of LSO, and LSO wear schedule; Pt verbalized good understanding. Pt performed log roll onto R side followed by sidelying>sit with SBA. Pt required Mod A for LB dressing and Min A for donning  of LSO while seated/standing as appropriate. Pt performed all sit<>stand transfers and fxl ambulation with CGA/SBA utilizing rwx. Pt performed  all toileting tasks with CGA/SBA. Pt demos good understanding of all education provided and demos good safety awareness. Pt will be discharging home with assist from her  on this date.  Plan of Care Reviewed With: patient  Outcome Evaluation: OT eval completed. Pt in fowlers upon therapist arrival; A&Ox4; c/o 7/10 back pain at rest. Pt reports Mod I-I with all BADLs including fxl ambulation at [facility]. Today, Pt educated on spinal precautions, donning/doffing of LSO, and LSO wear schedule; Pt verbalized good understanding. Pt performed log roll onto R side followed by sidelying>sit with SBA. Pt required Mod A for LB dressing and Min A for donning  of LSO while seated/standing as appropriate. Pt performed all sit<>stand transfers and fxl ambulation with CGA/SBA utilizing rwx. Pt performed all toileting tasks with CGA/SBA. Pt demos good understanding of all education provided and demos good safety awareness. Pt will be discharging home with assist from her  on this date.     Time Calculation:         Time Calculation- OT       Row Name 03/29/24 0831             Time Calculation- OT    OT Start Time 0831  +10 minutes chart review  -LS      OT Stop Time 0919  -      OT Time Calculation (min) 48 min  -      OT Non-Billable Time (min) 58 min  -      OT Received On 03/29/24  -                User Key  (r) = Recorded By, (t) = Taken By, (c) = Cosigned By      Initials Name Provider Type     Jess Coleman OTR/L Occupational Therapist                  Therapy Charges for Today       Code Description Service Date Service Provider Modifiers Qty    [id number]  OT EVAL LOW COMPLEXITY 4 3/29/2024 Jess Coleman OTR/L GO 1               OT Discharge Summary  Anticipated Discharge Disposition (OT): home with assist  Reason for Discharge: Discharge from facility  Outcomes Achieved: Discharge from facility occurred on same date as evluation  Discharge Destination: Home with assist    Jess Coleman  OTR/L  3/29/2024

## 2024-04-01 NOTE — DISCHARGE SUMMARY
Date of Discharge:  4/1/2024    Discharge Diagnosis: Degenerative disc disease, lumbar [M51.36]  Lumbar radiculopathy [M54.16]  Spondylolisthesis [M43.10]    Presenting Problem/History of Present Illness  Degenerative disc disease, lumbar [M51.36]  Lumbar radiculopathy [M54.16]  Spondylolisthesis [M43.10]       Hospital Course  Patient is a 58 y.o. female presented with Degenerative disc disease, lumbar [M51.36]  Lumbar radiculopathy [M54.16]  Spondylolisthesis [M43.10].  The patient has been through all manner of conservative care without any meaningful improvement. The patient went to the operating room for a transforaminal lumbar interbody fusion.  The patient tolerated procedure well.  Currently the patient is ambulating.  The patient is tolerating p.o.  The patient is voiding spontaneously.  It is felt that at this time the patient is stable and suitable to be discharged home.  See orders for discharge instructions and restrictions.  The patient can take the dressing off in 72 hours and can get the wound wet at that time.  The patient is to clean the wound daily with soap and water.  They are to call the office with any drainage from the incision or worsening pain.    Procedures Performed  Procedure(s):  LUMBAR LAMINECTOMY TRANSFORAMINAL LUMBAR INTERBODY FUSION L3-4 with revision of pedicle screws from L3-S1       Consults:   Consults       No orders found from 2/28/2024 to 3/29/2024.              Condition on Discharge: Stable    Vital Signs       Physical Exam:   Physical Exam  Neurological:      Mental Status: She is oriented to person, place, and time.      Cranial Nerves: Cranial nerves 2-12 are intact.      Motor: Motor strength is normal.     Gait: Gait is intact.   Psychiatric:         Speech: Speech normal.          Neurologic Exam     Mental Status   Oriented to person, place, and time.   Attention: normal. Concentration: normal.   Speech: speech is normal   Level of consciousness: alert  Knowledge:  good.   Normal comprehension.     Cranial Nerves   Cranial nerves II through XII intact.     Motor Exam     Strength   Strength 5/5 throughout.     Sensory Exam   Light touch normal.     Gait, Coordination, and Reflexes     Gait  Gait: normal         Discharge Disposition  Home or Self Care    Discharge Medications     Discharge Medications        New Medications        Instructions Start Date   cyclobenzaprine 10 MG tablet  Commonly known as: FLEXERIL   10 mg, Oral, 3 Times Daily PRN      ketorolac 10 MG tablet  Commonly known as: TORADOL   10 mg, Oral, Every 6 Hours PRN      naloxone 4 MG/0.1ML nasal spray  Commonly known as: NARCAN   Call 911. Don't prime. [place] in 1 nostril for overdose. Repeat in 2-3 minutes in other nostril if no or minimal breathing/responsiveness.      oxyCODONE-acetaminophen 7.5-325 MG per tablet  Commonly known as: PERCOCET   1 tablet, Oral, Every 6 Hours PRN             Continue These Medications        Instructions Start Date   acetaminophen 500 MG tablet  Commonly known as: TYLENOL   1,000 mg, Oral, Every 6 Hours PRN      ALPHA-D-GALACTOSIDASE PO   1 tablet, Oral, Every Morning      atorvastatin 10 MG tablet  Commonly known as: LIPITOR   10 mg, Oral, Nightly      calcium carbonate 500 MG chewable tablet  Commonly known as: TUMS   1 tablet, Oral, 2 Times Daily      carvedilol 12.5 MG tablet  Commonly known as: COREG   12.5 mg, Oral, 2 Times Daily With Meals      esomeprazole 20 MG capsule  Commonly known as: nexIUM   40 mg, Oral, 2 Times Daily      ferrous sulfate 140 (45 Fe) MG tablet controlled-release tablet   140 mg, Oral, Daily With Breakfast      gabapentin 300 MG capsule  Commonly known as: NEURONTIN   900 mg, Oral, 3 Times Daily      hydroCHLOROthiazide 25 MG tablet   25 mg, Oral, Daily      LACTOBACILLUS RHAMNOSUS (GG) PO   1 capsule, Oral, Daily      losartan 50 MG tablet  Commonly known as: COZAAR   50 mg, Oral, Daily      nortriptyline 10 MG capsule  Commonly known as:  PAMELOR   40 mg, Oral, Nightly      Ozempic (0.25 or 0.5 MG/DOSE) 2 MG/3ML solution pen-injector  Generic drug: Semaglutide(0.25 or 0.5MG/DOS)   0.25 mg, Subcutaneous, Weekly      propranolol LA 80 MG 24 hr capsule  Commonly known as: INDERAL LA   80 mg, Oral, Daily      vitamin B-12 1000 MCG tablet  Commonly known as: CYANOCOBALAMIN   1,000 mcg, Oral, Daily             Stop These Medications      meloxicam 7.5 MG tablet  Commonly known as: MOBIC     traMADol 50 MG tablet  Commonly known as: ULTRAM              Discharge Diet: Regular    Activity at Discharge: No lifting over 10 pounds, avoid bending and twisting, follow-up in office in 2 weeks    Follow-up Appointments  Future Appointments   Date Time Provider Department Center   4/12/2024 11:15 AM Sterling Ashford PA-C [facility]   5/13/2024 10:00 AM Shin Rojo DO [facility]         Test Results Pending at Discharge       Shin Rojo DO  04/01/24  17:58 CDT    Time: Discharge 20 min

## 2024-04-09 ENCOUNTER — TELEPHONE (OUTPATIENT)
Dept: NEUROSURGERY | Facility: CLINIC | Age: 59
End: 2024-04-09
Payer: COMMERCIAL

## 2024-04-09 DIAGNOSIS — M51.36 DEGENERATIVE DISC DISEASE, LUMBAR: Primary | ICD-10-CM

## 2024-04-09 DIAGNOSIS — M54.16 LUMBAR RADICULOPATHY: Primary | ICD-10-CM

## 2024-04-09 RX ORDER — METHYLPREDNISOLONE 4 MG/1
TABLET ORAL
Qty: 21 TABLET | Refills: 0 | Status: SHIPPED | OUTPATIENT
Start: 2024-04-09

## 2024-04-09 RX ORDER — OXYCODONE AND ACETAMINOPHEN 10; 325 MG/1; MG/1
1 TABLET ORAL EVERY 6 HOURS PRN
Qty: 24 TABLET | Refills: 0 | Status: SHIPPED | OUTPATIENT
Start: 2024-04-09 | End: 2024-04-15

## 2024-04-09 NOTE — TELEPHONE ENCOUNTER
----- Message from Jyoti Dick sent at 4/9/2024  9:45 AM CDT -----  Regarding: Severe pain  Contact: [phone]  I will be out of oxycodone tomorrow. What should I do for pain?

## 2024-04-09 NOTE — TELEPHONE ENCOUNTER
Patient notified Dr. Rojo will call in some pain meds but she really needs to give the steroid pack a chance to work he does believe that will help her pain.

## 2024-04-12 ENCOUNTER — OFFICE VISIT (OUTPATIENT)
Dept: NEUROSURGERY | Facility: CLINIC | Age: 59
End: 2024-04-12
Payer: COMMERCIAL

## 2024-04-12 ENCOUNTER — HOSPITAL ENCOUNTER (OUTPATIENT)
Dept: GENERAL RADIOLOGY | Facility: HOSPITAL | Age: 59
Discharge: HOME | End: 2024-04-12
Admitting: NEUROLOGICAL SURGERY
Payer: COMMERCIAL

## 2024-04-12 VITALS — BODY MASS INDEX: 29.09 KG/M2 | WEIGHT: 170.4 LBS | HEIGHT: 64 IN

## 2024-04-12 DIAGNOSIS — M51.36 DEGENERATIVE DISC DISEASE, LUMBAR: Primary | ICD-10-CM

## 2024-04-12 DIAGNOSIS — E66.3 OVERWEIGHT WITH BODY MASS INDEX (BMI) 25.0-29.9: ICD-10-CM

## 2024-04-12 DIAGNOSIS — Z78.9 NONSMOKER: ICD-10-CM

## 2024-04-12 DIAGNOSIS — M54.16 LUMBAR RADICULOPATHY: ICD-10-CM

## 2024-04-12 DIAGNOSIS — M51.36 DEGENERATIVE DISC DISEASE, LUMBAR: ICD-10-CM

## 2024-04-12 PROCEDURE — 72100 X-RAY EXAM L-S SPINE 2/3 VWS: CPT

## 2024-04-12 RX ORDER — POLYETHYLENE GLYCOL 3350 17 G/17G
POWDER, FOR SOLUTION ORAL
COMMUNITY
Start: 2024-02-24

## 2024-04-12 RX ORDER — AMOXICILLIN 250 MG
1 CAPSULE ORAL
COMMUNITY
Start: 2024-02-24

## 2024-04-12 NOTE — PROGRESS NOTES
"    Chief complaint:   Chief Complaint   Patient presents with    Post-op     Lumbar laminectomy with fusion- pt states doing pretty good. Still having some pain         Subjective     HPI: Juani is 2 weeks status post L3-4 TLIF with revision pedicle screws L3-S1.  She called a few days ago having some increased pain and new pain in the right buttock, groin and anterior thigh.  Steroid pack was called in.  She is on day 3 and states this is the best that she has felt since surgery.  She continues on oxycodone as well as gabapentin.  Her left leg pain which she describes as stinging and numbness is better than preop.  She denies any postop fevers as well as wound drainage, calf pain, chest pain and shortness of breath.    Review of Systems      Objective      Vital Signs  Ht 162 cm (63.78\")   Wt 77.3 kg (170 lb 6.4 oz)   BMI 29.45 kg/m²     Physical Exam  Constitutional:       Appearance: She is well-developed.   HENT:      Head: Normocephalic.   Eyes:      Pupils: Pupils are equal, round, and reactive to light.   Cardiovascular:      Rate and Rhythm: Normal rate.   Pulmonary:      Effort: Pulmonary effort is normal.   Musculoskeletal:         General: Normal range of motion.      Cervical back: Normal range of motion.   Skin:     General: Skin is warm and dry.      Comments: Lumbar incisions are healed.  Steri-Strips were removed.  No signs of infection.   Neurological:      Mental Status: She is alert and oriented to person, place, and time.      GCS: GCS eye subscore is 4. GCS verbal subscore is 5. GCS motor subscore is 6.      Cranial Nerves: No cranial nerve deficit.      Sensory: Sensory deficit present.      Motor: No weakness.      Gait: Gait abnormal.      Deep Tendon Reflexes: Reflexes are normal and symmetric.   Psychiatric:         Speech: Speech normal.         Behavior: Behavior normal.         Thought Content: Thought content normal.         Neurologic Exam     Mental Status   Oriented to person, " place, and time.   Speech: speech is normal   Level of consciousness: alert  Knowledge: good.     Cranial Nerves     CN III, IV, VI   Pupils are equal, round, and reactive to light.    Motor Exam     Strength   Right iliopsoas: 5/5  Left iliopsoas: 5/5  Right quadriceps: 5/5  Left quadriceps: 5/5  Right hamstrin/5  Left hamstrin/5  Right anterior tibial: 5/5  Left anterior tibial: 5/5  Right posterior tibial: 5/5  Left posterior tibial: 5/5  Right gastroc: 5/5  Left gastroc: 5/5    Gait, Coordination, and Reflexes Gait is steady with rollator       Imaging review: AP and lateral lumbar films today were reviewed and demonstrate pedicle screw fixation L3-4 and L5-S1.  Fusion changes at L5-S1 are stable.  Interbody graft at L3-4 has migrated 1.5 cm anterior to the posterior L4 vertebral margin on the left.        Assessment/Plan: Dr. Rojo reviewed images and saw the patient.  Unfortunately, her graft has migrated and will need to be removed and replaced with a larger graft.  He discussed this in detail with Juani and her .  She is okay to go home today as her pain is improved and she is neurologically stable.  We will contact her next week about proceeding with revision.    She will continue her medications as current as well as 10 pound lifting restriction and LSO brace when standing or walking.    She will contact us should she have any issues in the interim.      Patient is a nonsmoker    The patient's Body mass index is 29.45 kg/m².. BMI is above normal parameters. Recommendations include: educational material    Diagnoses and all orders for this visit:    1. Degenerative disc disease, lumbar (Primary)    2. Lumbar radiculopathy    3. Nonsmoker    4. Overweight with body mass index (BMI) 25.0-29.9        I discussed the patients findings and my recommendations with patient  Sterling Ashford PA-C  24  12:03 CDT

## 2024-04-17 ENCOUNTER — APPOINTMENT (OUTPATIENT)
Dept: CT IMAGING | Facility: HOSPITAL | Age: 59
End: 2024-04-17
Payer: COMMERCIAL

## 2024-04-17 ENCOUNTER — ANESTHESIA (OUTPATIENT)
Dept: PERIOP | Facility: HOSPITAL | Age: 59
End: 2024-04-17
Payer: COMMERCIAL

## 2024-04-17 ENCOUNTER — APPOINTMENT (OUTPATIENT)
Dept: GENERAL RADIOLOGY | Facility: HOSPITAL | Age: 59
End: 2024-04-17
Payer: COMMERCIAL

## 2024-04-17 ENCOUNTER — ANESTHESIA EVENT (OUTPATIENT)
Dept: PERIOP | Facility: HOSPITAL | Age: 59
End: 2024-04-17
Payer: COMMERCIAL

## 2024-04-17 ENCOUNTER — HOSPITAL ENCOUNTER (OUTPATIENT)
Facility: HOSPITAL | Age: 59
Discharge: HOME | End: 2024-04-18
Attending: NEUROLOGICAL SURGERY | Admitting: NEUROLOGICAL SURGERY
Payer: COMMERCIAL

## 2024-04-17 ENCOUNTER — PREP FOR SURGERY (OUTPATIENT)
Dept: OTHER | Facility: HOSPITAL | Age: 59
End: 2024-04-17
Payer: COMMERCIAL

## 2024-04-17 DIAGNOSIS — M51.36 DEGENERATIVE DISC DISEASE, LUMBAR: Primary | ICD-10-CM

## 2024-04-17 DIAGNOSIS — G89.18 POST-OP PAIN: ICD-10-CM

## 2024-04-17 DIAGNOSIS — M51.36 DEGENERATIVE DISC DISEASE, LUMBAR: ICD-10-CM

## 2024-04-17 DIAGNOSIS — M54.41 RIGHT-SIDED LOW BACK PAIN WITH RIGHT-SIDED SCIATICA, UNSPECIFIED CHRONICITY: Primary | ICD-10-CM

## 2024-04-17 PROBLEM — M41.9 SCOLIOSIS: Status: ACTIVE | Noted: 2024-04-17

## 2024-04-17 LAB
ALBUMIN SERPL-MCNC: 4.4 G/DL (ref 3.5–5.2)
ALBUMIN/GLOB SERPL: 2.1 G/DL
ALP SERPL-CCNC: 142 U/L (ref 39–117)
ALT SERPL W P-5'-P-CCNC: 13 U/L (ref 1–33)
ANION GAP SERPL CALCULATED.3IONS-SCNC: 8 MMOL/L (ref 5–15)
AST SERPL-CCNC: 10 U/L (ref 1–32)
BASOPHILS # BLD AUTO: 0.07 10*3/MM3 (ref 0–0.2)
BASOPHILS NFR BLD AUTO: 0.8 % (ref 0–1.5)
BILIRUB SERPL-MCNC: 0.3 MG/DL (ref 0–1.2)
BUN SERPL-MCNC: 21 MG/DL (ref 6–20)
BUN/CREAT SERPL: 52.5 (ref 7–25)
CALCIUM SPEC-SCNC: 9.1 MG/DL (ref 8.6–10.5)
CHLORIDE SERPL-SCNC: 100 MMOL/L (ref 98–107)
CO2 SERPL-SCNC: 28 MMOL/L (ref 22–29)
CREAT SERPL-MCNC: 0.4 MG/DL (ref 0.57–1)
DEPRECATED RDW RBC AUTO: 50.1 FL (ref 37–54)
EGFRCR SERPLBLD CKD-EPI 2021: 114.2 ML/MIN/1.73
EOSINOPHIL # BLD AUTO: 0.23 10*3/MM3 (ref 0–0.4)
EOSINOPHIL NFR BLD AUTO: 2.7 % (ref 0.3–6.2)
ERYTHROCYTE [DISTWIDTH] IN BLOOD BY AUTOMATED COUNT: 14.3 % (ref 12.3–15.4)
GLOBULIN UR ELPH-MCNC: 2.1 GM/DL
GLUCOSE BLDC GLUCOMTR-MCNC: 172 MG/DL (ref 70–130)
GLUCOSE SERPL-MCNC: 153 MG/DL (ref 65–99)
HCT VFR BLD AUTO: 39.1 % (ref 34–46.6)
HGB BLD-MCNC: 12.9 G/DL (ref 12–15.9)
IMM GRANULOCYTES # BLD AUTO: 0.35 10*3/MM3 (ref 0–0.05)
IMM GRANULOCYTES NFR BLD AUTO: 4.1 % (ref 0–0.5)
LYMPHOCYTES # BLD AUTO: 1.45 10*3/MM3 (ref 0.7–3.1)
LYMPHOCYTES NFR BLD AUTO: 17 % (ref 19.6–45.3)
MCH RBC QN AUTO: 31.6 PG (ref 26.6–33)
MCHC RBC AUTO-ENTMCNC: 33 G/DL (ref 31.5–35.7)
MCV RBC AUTO: 95.8 FL (ref 79–97)
MONOCYTES # BLD AUTO: 0.61 10*3/MM3 (ref 0.1–0.9)
MONOCYTES NFR BLD AUTO: 7.1 % (ref 5–12)
NEUTROPHILS NFR BLD AUTO: 5.83 10*3/MM3 (ref 1.7–7)
NEUTROPHILS NFR BLD AUTO: 68.3 % (ref 42.7–76)
NRBC BLD AUTO-RTO: 0 /100 WBC (ref 0–0.2)
PLATELET # BLD AUTO: 283 10*3/MM3 (ref 140–450)
PMV BLD AUTO: 9.8 FL (ref 6–12)
POTASSIUM SERPL-SCNC: 3.9 MMOL/L (ref 3.5–5.2)
PROT SERPL-MCNC: 6.5 G/DL (ref 6–8.5)
RBC # BLD AUTO: 4.08 10*6/MM3 (ref 3.77–5.28)
SODIUM SERPL-SCNC: 136 MMOL/L (ref 136–145)
WBC NRBC COR # BLD AUTO: 8.54 10*3/MM3 (ref 3.4–10.8)

## 2024-04-17 PROCEDURE — 80053 COMPREHEN METABOLIC PANEL: CPT | Performed by: NURSE PRACTITIONER

## 2024-04-17 PROCEDURE — C1713 ANCHOR/SCREW BN/BN,TIS/BN: HCPCS | Performed by: NEUROLOGICAL SURGERY

## 2024-04-17 PROCEDURE — 25010000002 CEFAZOLIN 3 G RECONSTITUTED SOLUTION 1 EACH VIAL: Performed by: NEUROLOGICAL SURGERY

## 2024-04-17 PROCEDURE — 82948 REAGENT STRIP/BLOOD GLUCOSE: CPT

## 2024-04-17 PROCEDURE — 25010000002 ONDANSETRON PER 1 MG: Performed by: NURSE ANESTHETIST, CERTIFIED REGISTERED

## 2024-04-17 PROCEDURE — 25010000002 BUPIVACAINE 0.25 % SOLUTION: Performed by: NEUROLOGICAL SURGERY

## 2024-04-17 PROCEDURE — 25010000002 MIDAZOLAM PER 1 MG: Performed by: ANESTHESIOLOGY

## 2024-04-17 PROCEDURE — 22853 INSJ BIOMECHANICAL DEVICE: CPT | Performed by: NEUROLOGICAL SURGERY

## 2024-04-17 PROCEDURE — 72133 CT LUMBAR SPINE W/O & W/DYE: CPT

## 2024-04-17 PROCEDURE — 72194 CT PELVIS W/O & W/DYE: CPT

## 2024-04-17 PROCEDURE — 25010000002 FENTANYL CITRATE (PF) 50 MCG/ML SOLUTION: Performed by: ANESTHESIOLOGY

## 2024-04-17 PROCEDURE — G0378 HOSPITAL OBSERVATION PER HR: HCPCS

## 2024-04-17 PROCEDURE — 25810000003 LACTATED RINGERS PER 1000 ML: Performed by: NEUROLOGICAL SURGERY

## 2024-04-17 PROCEDURE — 25010000002 PROPOFOL 200 MG/20ML EMULSION

## 2024-04-17 PROCEDURE — 25010000002 DEXAMETHASONE PER 1 MG: Performed by: ANESTHESIOLOGY

## 2024-04-17 PROCEDURE — 85025 COMPLETE CBC W/AUTO DIFF WBC: CPT | Performed by: NURSE PRACTITIONER

## 2024-04-17 PROCEDURE — 25010000002 HYDROMORPHONE PER 4 MG: Performed by: NURSE PRACTITIONER

## 2024-04-17 PROCEDURE — 22558 ARTHRD ANT NTRBD MIN DSC LUM: CPT | Performed by: NEUROLOGICAL SURGERY

## 2024-04-17 PROCEDURE — 25810000003 LACTATED RINGERS PER 1000 ML: Performed by: ANESTHESIOLOGY

## 2024-04-17 PROCEDURE — 72100 X-RAY EXAM L-S SPINE 2/3 VWS: CPT

## 2024-04-17 PROCEDURE — 96374 THER/PROPH/DIAG INJ IV PUSH: CPT

## 2024-04-17 PROCEDURE — 22845 INSERT SPINE FIXATION DEVICE: CPT | Performed by: NEUROLOGICAL SURGERY

## 2024-04-17 PROCEDURE — 61783 SCAN PROC SPINAL: CPT | Performed by: NEUROLOGICAL SURGERY

## 2024-04-17 PROCEDURE — 25010000002 HYDROMORPHONE PER 4 MG

## 2024-04-17 PROCEDURE — 99285 EMERGENCY DEPT VISIT HI MDM: CPT

## 2024-04-17 PROCEDURE — 25010000002 FENTANYL CITRATE (PF) 100 MCG/2ML SOLUTION

## 2024-04-17 PROCEDURE — 25510000001 IOPAMIDOL 61 % SOLUTION: Performed by: EMERGENCY MEDICINE

## 2024-04-17 PROCEDURE — 25010000002 VANCOMYCIN 1 G RECONSTITUTED SOLUTION 1 EACH VIAL: Performed by: NEUROLOGICAL SURGERY

## 2024-04-17 PROCEDURE — 25010000002 HYDROMORPHONE 1 MG/ML SOLUTION: Performed by: NURSE ANESTHETIST, CERTIFIED REGISTERED

## 2024-04-17 PROCEDURE — 25010000002 HYDROMORPHONE PER 4 MG: Performed by: ANESTHESIOLOGY

## 2024-04-17 PROCEDURE — 76000 FLUOROSCOPY <1 HR PHYS/QHP: CPT

## 2024-04-17 PROCEDURE — 25010000002 LABETALOL 5 MG/ML SOLUTION

## 2024-04-17 DEVICE — ABSORBABLE HEMOSTAT (OXIDIZED REGENERATED CELLULOSE)
Type: IMPLANTABLE DEVICE | Site: SPINE LUMBAR | Status: FUNCTIONAL
Brand: SURGICEL

## 2024-04-17 DEVICE — SPACER 2111250 OLIF25 20MM 6 DEG 10X50
Type: IMPLANTABLE DEVICE | Site: SPINE LUMBAR | Status: FUNCTIONAL
Brand: PIVOX™ OBLIQUE LATERAL SPINAL SYSTEM

## 2024-04-17 DEVICE — HEMOST ABS SURGIFOAM SZ100 8X12 10MM: Type: IMPLANTABLE DEVICE | Site: SPINE LUMBAR | Status: FUNCTIONAL

## 2024-04-17 DEVICE — BIOLOGIC 7600105 85 BTCP 15 HA 5CC VIAL
Type: IMPLANTABLE DEVICE | Site: SPINE LUMBAR | Status: FUNCTIONAL
Brand: MASTERGRAFT® GRANULES

## 2024-04-17 RX ORDER — ATORVASTATIN CALCIUM 10 MG/1
10 TABLET, FILM COATED ORAL NIGHTLY
Status: DISCONTINUED | OUTPATIENT
Start: 2024-04-17 | End: 2024-04-18 | Stop reason: HOSPADM

## 2024-04-17 RX ORDER — NALOXONE HCL 0.4 MG/ML
0.04 VIAL (ML) INJECTION AS NEEDED
Status: DISCONTINUED | OUTPATIENT
Start: 2024-04-17 | End: 2024-04-17 | Stop reason: HOSPADM

## 2024-04-17 RX ORDER — SODIUM CHLORIDE, SODIUM LACTATE, POTASSIUM CHLORIDE, CALCIUM CHLORIDE 600; 310; 30; 20 MG/100ML; MG/100ML; MG/100ML; MG/100ML
100 INJECTION, SOLUTION INTRAVENOUS CONTINUOUS
Status: DISCONTINUED | OUTPATIENT
Start: 2024-04-17 | End: 2024-04-17

## 2024-04-17 RX ORDER — SUCCINYLCHOLINE/SOD CL,ISO/PF 200MG/10ML
SYRINGE (ML) INTRAVENOUS AS NEEDED
Status: DISCONTINUED | OUTPATIENT
Start: 2024-04-17 | End: 2024-04-18 | Stop reason: SURG

## 2024-04-17 RX ORDER — ROCURONIUM BROMIDE 10 MG/ML
INJECTION, SOLUTION INTRAVENOUS AS NEEDED
Status: DISCONTINUED | OUTPATIENT
Start: 2024-04-17 | End: 2024-04-18 | Stop reason: SURG

## 2024-04-17 RX ORDER — MIDAZOLAM HYDROCHLORIDE 1 MG/ML
2 INJECTION INTRAMUSCULAR; INTRAVENOUS
Status: DISCONTINUED | OUTPATIENT
Start: 2024-04-17 | End: 2024-04-17 | Stop reason: HOSPADM

## 2024-04-17 RX ORDER — SODIUM CHLORIDE 9 MG/ML
40 INJECTION, SOLUTION INTRAVENOUS AS NEEDED
Status: DISCONTINUED | OUTPATIENT
Start: 2024-04-17 | End: 2024-04-17 | Stop reason: HOSPADM

## 2024-04-17 RX ORDER — HYDROCHLOROTHIAZIDE 25 MG/1
25 TABLET ORAL DAILY
Status: DISCONTINUED | OUTPATIENT
Start: 2024-04-17 | End: 2024-04-18 | Stop reason: HOSPADM

## 2024-04-17 RX ORDER — LIDOCAINE HYDROCHLORIDE 20 MG/ML
INJECTION, SOLUTION EPIDURAL; INFILTRATION; INTRACAUDAL; PERINEURAL AS NEEDED
Status: DISCONTINUED | OUTPATIENT
Start: 2024-04-17 | End: 2024-04-18 | Stop reason: SURG

## 2024-04-17 RX ORDER — SODIUM CHLORIDE 0.9 % (FLUSH) 0.9 %
3-10 SYRINGE (ML) INJECTION AS NEEDED
Status: DISCONTINUED | OUTPATIENT
Start: 2024-04-17 | End: 2024-04-17 | Stop reason: HOSPADM

## 2024-04-17 RX ORDER — HYDROMORPHONE HYDROCHLORIDE 2 MG/ML
INJECTION, SOLUTION INTRAMUSCULAR; INTRAVENOUS; SUBCUTANEOUS AS NEEDED
Status: DISCONTINUED | OUTPATIENT
Start: 2024-04-17 | End: 2024-04-18 | Stop reason: SURG

## 2024-04-17 RX ORDER — FENTANYL CITRATE 50 UG/ML
50 INJECTION, SOLUTION INTRAMUSCULAR; INTRAVENOUS
Status: COMPLETED | OUTPATIENT
Start: 2024-04-17 | End: 2024-04-17

## 2024-04-17 RX ORDER — OXYCODONE AND ACETAMINOPHEN 10; 325 MG/1; MG/1
1 TABLET ORAL EVERY 4 HOURS PRN
Status: DISCONTINUED | OUTPATIENT
Start: 2024-04-17 | End: 2024-04-17 | Stop reason: HOSPADM

## 2024-04-17 RX ORDER — CEPHALEXIN 500 MG/1
500 CAPSULE ORAL EVERY 6 HOURS SCHEDULED
Status: COMPLETED | OUTPATIENT
Start: 2024-04-17 | End: 2024-04-18

## 2024-04-17 RX ORDER — SODIUM CHLORIDE 0.9 % (FLUSH) 0.9 %
10 SYRINGE (ML) INJECTION AS NEEDED
Status: DISCONTINUED | OUTPATIENT
Start: 2024-04-17 | End: 2024-04-17

## 2024-04-17 RX ORDER — HYDROMORPHONE HYDROCHLORIDE 1 MG/ML
0.5 INJECTION, SOLUTION INTRAMUSCULAR; INTRAVENOUS; SUBCUTANEOUS
Status: DISCONTINUED | OUTPATIENT
Start: 2024-04-17 | End: 2024-04-17 | Stop reason: HOSPADM

## 2024-04-17 RX ORDER — ONDANSETRON 2 MG/ML
4 INJECTION INTRAMUSCULAR; INTRAVENOUS
Status: DISCONTINUED | OUTPATIENT
Start: 2024-04-17 | End: 2024-04-17 | Stop reason: HOSPADM

## 2024-04-17 RX ORDER — CARVEDILOL 6.25 MG/1
12.5 TABLET ORAL 2 TIMES DAILY WITH MEALS
Status: DISCONTINUED | OUTPATIENT
Start: 2024-04-17 | End: 2024-04-18 | Stop reason: HOSPADM

## 2024-04-17 RX ORDER — DROPERIDOL 2.5 MG/ML
0.62 INJECTION, SOLUTION INTRAMUSCULAR; INTRAVENOUS ONCE AS NEEDED
Status: DISCONTINUED | OUTPATIENT
Start: 2024-04-17 | End: 2024-04-17 | Stop reason: HOSPADM

## 2024-04-17 RX ORDER — SODIUM CHLORIDE, SODIUM LACTATE, POTASSIUM CHLORIDE, CALCIUM CHLORIDE 600; 310; 30; 20 MG/100ML; MG/100ML; MG/100ML; MG/100ML
1000 INJECTION, SOLUTION INTRAVENOUS CONTINUOUS
Status: DISCONTINUED | OUTPATIENT
Start: 2024-04-17 | End: 2024-04-17

## 2024-04-17 RX ORDER — NORTRIPTYLINE HYDROCHLORIDE 10 MG/1
40 CAPSULE ORAL NIGHTLY
Status: DISCONTINUED | OUTPATIENT
Start: 2024-04-17 | End: 2024-04-18 | Stop reason: HOSPADM

## 2024-04-17 RX ORDER — LABETALOL HYDROCHLORIDE 5 MG/ML
INJECTION, SOLUTION INTRAVENOUS AS NEEDED
Status: DISCONTINUED | OUTPATIENT
Start: 2024-04-17 | End: 2024-04-18 | Stop reason: SURG

## 2024-04-17 RX ORDER — LOSARTAN POTASSIUM 50 MG/1
50 TABLET ORAL DAILY
Status: DISCONTINUED | OUTPATIENT
Start: 2024-04-17 | End: 2024-04-18 | Stop reason: HOSPADM

## 2024-04-17 RX ORDER — PROPOFOL 10 MG/ML
INJECTION, EMULSION INTRAVENOUS AS NEEDED
Status: DISCONTINUED | OUTPATIENT
Start: 2024-04-17 | End: 2024-04-18 | Stop reason: SURG

## 2024-04-17 RX ORDER — FLUMAZENIL 0.1 MG/ML
0.2 INJECTION INTRAVENOUS AS NEEDED
Status: DISCONTINUED | OUTPATIENT
Start: 2024-04-17 | End: 2024-04-17 | Stop reason: HOSPADM

## 2024-04-17 RX ORDER — BUPIVACAINE HYDROCHLORIDE 2.5 MG/ML
INJECTION, SOLUTION INFILTRATION; PERINEURAL AS NEEDED
Status: DISCONTINUED | OUTPATIENT
Start: 2024-04-17 | End: 2024-04-17 | Stop reason: HOSPADM

## 2024-04-17 RX ORDER — HYDROMORPHONE HYDROCHLORIDE 1 MG/ML
0.5 INJECTION, SOLUTION INTRAMUSCULAR; INTRAVENOUS; SUBCUTANEOUS AS NEEDED
Status: DISCONTINUED | OUTPATIENT
Start: 2024-04-17 | End: 2024-04-17

## 2024-04-17 RX ORDER — ONDANSETRON 2 MG/ML
4 INJECTION INTRAMUSCULAR; INTRAVENOUS EVERY 6 HOURS PRN
Status: DISCONTINUED | OUTPATIENT
Start: 2024-04-17 | End: 2024-04-18 | Stop reason: HOSPADM

## 2024-04-17 RX ORDER — OXYCODONE HYDROCHLORIDE 5 MG/1
5 TABLET ORAL EVERY 6 HOURS PRN
COMMUNITY

## 2024-04-17 RX ORDER — HYDROMORPHONE HYDROCHLORIDE 1 MG/ML
0.5 INJECTION, SOLUTION INTRAMUSCULAR; INTRAVENOUS; SUBCUTANEOUS ONCE
Status: COMPLETED | OUTPATIENT
Start: 2024-04-17 | End: 2024-04-17

## 2024-04-17 RX ORDER — SODIUM CHLORIDE 0.9 % (FLUSH) 0.9 %
10 SYRINGE (ML) INJECTION AS NEEDED
Status: DISCONTINUED | OUTPATIENT
Start: 2024-04-17 | End: 2024-04-17 | Stop reason: HOSPADM

## 2024-04-17 RX ORDER — AMOXICILLIN 250 MG
1 CAPSULE ORAL 2 TIMES DAILY
Status: DISCONTINUED | OUTPATIENT
Start: 2024-04-17 | End: 2024-04-18

## 2024-04-17 RX ORDER — OXYCODONE AND ACETAMINOPHEN 10; 325 MG/1; MG/1
1 TABLET ORAL EVERY 6 HOURS PRN
Status: DISCONTINUED | OUTPATIENT
Start: 2024-04-17 | End: 2024-04-18 | Stop reason: HOSPADM

## 2024-04-17 RX ORDER — PANTOPRAZOLE SODIUM 40 MG/1
40 TABLET, DELAYED RELEASE ORAL
Status: DISCONTINUED | OUTPATIENT
Start: 2024-04-18 | End: 2024-04-18 | Stop reason: HOSPADM

## 2024-04-17 RX ORDER — PROPRANOLOL HYDROCHLORIDE 80 MG/1
80 CAPSULE, EXTENDED RELEASE ORAL NIGHTLY
Status: DISCONTINUED | OUTPATIENT
Start: 2024-04-17 | End: 2024-04-18 | Stop reason: HOSPADM

## 2024-04-17 RX ORDER — DEXAMETHASONE SODIUM PHOSPHATE 4 MG/ML
4 INJECTION, SOLUTION INTRA-ARTICULAR; INTRALESIONAL; INTRAMUSCULAR; INTRAVENOUS; SOFT TISSUE ONCE AS NEEDED
Status: COMPLETED | OUTPATIENT
Start: 2024-04-17 | End: 2024-04-17

## 2024-04-17 RX ORDER — IBUPROFEN 600 MG/1
600 TABLET ORAL EVERY 6 HOURS PRN
Status: DISCONTINUED | OUTPATIENT
Start: 2024-04-17 | End: 2024-04-17 | Stop reason: HOSPADM

## 2024-04-17 RX ORDER — SODIUM CHLORIDE 0.9 % (FLUSH) 0.9 %
3 SYRINGE (ML) INJECTION EVERY 12 HOURS SCHEDULED
Status: DISCONTINUED | OUTPATIENT
Start: 2024-04-17 | End: 2024-04-17 | Stop reason: HOSPADM

## 2024-04-17 RX ORDER — GABAPENTIN 300 MG/1
900 CAPSULE ORAL EVERY 8 HOURS SCHEDULED
Status: DISCONTINUED | OUTPATIENT
Start: 2024-04-17 | End: 2024-04-18 | Stop reason: HOSPADM

## 2024-04-17 RX ORDER — FENTANYL CITRATE 50 UG/ML
INJECTION, SOLUTION INTRAMUSCULAR; INTRAVENOUS AS NEEDED
Status: DISCONTINUED | OUTPATIENT
Start: 2024-04-17 | End: 2024-04-18 | Stop reason: SURG

## 2024-04-17 RX ORDER — LABETALOL HYDROCHLORIDE 5 MG/ML
5 INJECTION, SOLUTION INTRAVENOUS
Status: DISCONTINUED | OUTPATIENT
Start: 2024-04-17 | End: 2024-04-17 | Stop reason: HOSPADM

## 2024-04-17 RX ORDER — ONDANSETRON 2 MG/ML
INJECTION INTRAMUSCULAR; INTRAVENOUS AS NEEDED
Status: DISCONTINUED | OUTPATIENT
Start: 2024-04-17 | End: 2024-04-18 | Stop reason: SURG

## 2024-04-17 RX ORDER — MAGNESIUM HYDROXIDE 1200 MG/15ML
LIQUID ORAL AS NEEDED
Status: DISCONTINUED | OUTPATIENT
Start: 2024-04-17 | End: 2024-04-17 | Stop reason: HOSPADM

## 2024-04-17 RX ADMIN — HYDROMORPHONE HYDROCHLORIDE 0.5 MG: 1 INJECTION, SOLUTION INTRAMUSCULAR; INTRAVENOUS; SUBCUTANEOUS at 18:28

## 2024-04-17 RX ADMIN — FENTANYL CITRATE 50 MCG: 50 INJECTION, SOLUTION INTRAMUSCULAR; INTRAVENOUS at 18:10

## 2024-04-17 RX ADMIN — HYDROMORPHONE HYDROCHLORIDE 1 MG: 1 INJECTION, SOLUTION INTRAMUSCULAR; INTRAVENOUS; SUBCUTANEOUS at 17:02

## 2024-04-17 RX ADMIN — MIDAZOLAM HYDROCHLORIDE 2 MG: 1 INJECTION, SOLUTION INTRAMUSCULAR; INTRAVENOUS at 13:57

## 2024-04-17 RX ADMIN — Medication 140 MG: at 14:05

## 2024-04-17 RX ADMIN — HYDROMORPHONE HYDROCHLORIDE 0.5 MG: 1 INJECTION, SOLUTION INTRAMUSCULAR; INTRAVENOUS; SUBCUTANEOUS at 18:10

## 2024-04-17 RX ADMIN — ONDANSETRON 4 MG: 2 INJECTION INTRAMUSCULAR; INTRAVENOUS at 17:00

## 2024-04-17 RX ADMIN — PROPOFOL 150 MG: 10 INJECTION, EMULSION INTRAVENOUS at 14:05

## 2024-04-17 RX ADMIN — DEXAMETHASONE SODIUM PHOSPHATE 4 MG: 4 INJECTION, SOLUTION INTRA-ARTICULAR; INTRALESIONAL; INTRAMUSCULAR; INTRAVENOUS; SOFT TISSUE at 12:34

## 2024-04-17 RX ADMIN — LOSARTAN POTASSIUM 50 MG: 50 TABLET, FILM COATED ORAL at 21:52

## 2024-04-17 RX ADMIN — DOCUSATE SODIUM AND SENNOSIDES 1 TABLET: 8.6; 5 TABLET, FILM COATED ORAL at 21:53

## 2024-04-17 RX ADMIN — LABETALOL HYDROCHLORIDE 10 MG: 5 INJECTION INTRAVENOUS at 15:37

## 2024-04-17 RX ADMIN — FENTANYL CITRATE 100 MCG: 50 INJECTION, SOLUTION INTRAMUSCULAR; INTRAVENOUS at 14:03

## 2024-04-17 RX ADMIN — NORTRIPTYLINE HYDROCHLORIDE 40 MG: 10 CAPSULE ORAL at 21:50

## 2024-04-17 RX ADMIN — HYDROMORPHONE HYDROCHLORIDE 2 MG: 2 INJECTION, SOLUTION INTRAMUSCULAR; INTRAVENOUS; SUBCUTANEOUS at 15:42

## 2024-04-17 RX ADMIN — GABAPENTIN 900 MG: 300 CAPSULE ORAL at 21:51

## 2024-04-17 RX ADMIN — SODIUM CHLORIDE 2000 MG: 900 INJECTION INTRAVENOUS at 14:14

## 2024-04-17 RX ADMIN — FENTANYL CITRATE 50 MCG: 50 INJECTION, SOLUTION INTRAMUSCULAR; INTRAVENOUS at 14:54

## 2024-04-17 RX ADMIN — PROPRANOLOL HYDROCHLORIDE 80 MG: 80 CAPSULE, EXTENDED RELEASE ORAL at 21:51

## 2024-04-17 RX ADMIN — ATORVASTATIN CALCIUM 10 MG: 10 TABLET, FILM COATED ORAL at 21:51

## 2024-04-17 RX ADMIN — HYDROMORPHONE HYDROCHLORIDE 0.5 MG: 1 INJECTION, SOLUTION INTRAMUSCULAR; INTRAVENOUS; SUBCUTANEOUS at 17:52

## 2024-04-17 RX ADMIN — IOPAMIDOL 100 ML: 612 INJECTION, SOLUTION INTRAVENOUS at 10:18

## 2024-04-17 RX ADMIN — CARVEDILOL 12.5 MG: 6.25 TABLET, FILM COATED ORAL at 21:53

## 2024-04-17 RX ADMIN — HYDROMORPHONE HYDROCHLORIDE 0.5 MG: 1 INJECTION, SOLUTION INTRAMUSCULAR; INTRAVENOUS; SUBCUTANEOUS at 09:23

## 2024-04-17 RX ADMIN — ROCURONIUM BROMIDE 5 MG: 10 INJECTION, SOLUTION INTRAVENOUS at 14:05

## 2024-04-17 RX ADMIN — CEPHALEXIN 500 MG: 500 CAPSULE ORAL at 21:52

## 2024-04-17 RX ADMIN — HYDROCHLOROTHIAZIDE 25 MG: 25 TABLET ORAL at 21:53

## 2024-04-17 RX ADMIN — SODIUM CHLORIDE, POTASSIUM CHLORIDE, SODIUM LACTATE AND CALCIUM CHLORIDE 100 ML/HR: 600; 310; 30; 20 INJECTION, SOLUTION INTRAVENOUS at 12:54

## 2024-04-17 RX ADMIN — LIDOCAINE HYDROCHLORIDE 100 MG: 20 INJECTION, SOLUTION EPIDURAL; INFILTRATION; INTRACAUDAL; PERINEURAL at 14:05

## 2024-04-17 RX ADMIN — FENTANYL CITRATE 50 MCG: 50 INJECTION, SOLUTION INTRAMUSCULAR; INTRAVENOUS at 17:57

## 2024-04-17 RX ADMIN — SODIUM CHLORIDE, POTASSIUM CHLORIDE, SODIUM LACTATE AND CALCIUM CHLORIDE 1000 ML: 600; 310; 30; 20 INJECTION, SOLUTION INTRAVENOUS at 12:16

## 2024-04-17 RX ADMIN — OXYCODONE AND ACETAMINOPHEN 1 TABLET: 325; 10 TABLET ORAL at 20:28

## 2024-04-17 RX ADMIN — CEPHALEXIN 500 MG: 500 CAPSULE ORAL at 23:47

## 2024-04-17 RX ADMIN — FENTANYL CITRATE 50 MCG: 50 INJECTION, SOLUTION INTRAMUSCULAR; INTRAVENOUS at 14:35

## 2024-04-17 NOTE — ANESTHESIA PREPROCEDURE EVALUATION
Anesthesia Evaluation     Patient summary reviewed   no history of anesthetic complications:   NPO Solid Status: > 8 hours  NPO Liquid Status: > 8 hours           Airway   Mallampati: I  TM distance: >3 FB  No difficulty expected  Dental          Pulmonary - negative pulmonary ROS   (-) asthma, sleep apnea, not a smoker  Cardiovascular     ECG reviewed    (+) hypertension, past MI (no stents, patient reports could be erroneous finding on testing) , dysrhythmias, CHF , hyperlipidemia  (-) angina    ROS comment: Stress test negative    Neuro/Psych  (-) seizures, TIA, CVA  GI/Hepatic/Renal/Endo    (+) GERD, diabetes mellitus  (-) liver disease, no renal disease    Musculoskeletal     Abdominal    Substance History      OB/GYN          Other                          Anesthesia Plan    ASA 2 - emergent     general     intravenous induction     Anesthetic plan, risks, benefits, and alternatives have been provided, discussed and informed consent has been obtained with: patient.        CODE STATUS:

## 2024-04-17 NOTE — ANESTHESIA PROCEDURE NOTES
Airway  Urgency: elective    Date/Time: 4/17/2024 2:06 PM  Airway not difficult    General Information and Staff    Patient location during procedure: OR  CRNA/CAA: Edson Maldonado CRNA    Indications and Patient Condition  Indications for airway management: airway protection    Preoxygenated: yes  Mask difficulty assessment: 1 - vent by mask    Final Airway Details  Final airway type: endotracheal airway      Successful airway: ETT  Cuffed: yes   Successful intubation technique: video laryngoscopy  Facilitating devices/methods: intubating stylet  Endotracheal tube insertion site: oral  Blade: Shetty  Blade size: 3  ETT size (mm): 7.0  Cormack-Lehane Classification: grade I - full view of glottis  Placement verified by: chest auscultation and capnometry   Cuff volume (mL): 5  Measured from: lips  ETT/EBT  to lips (cm): 19  Number of attempts at approach: 1  Assessment: lips, teeth, and gum same as pre-op and atraumatic intubation    Additional Comments  Intubation by Kyaw Parker SRNA

## 2024-04-17 NOTE — BRIEF OP NOTE
DIRECT LATERAL FUSION L3/4 with removal of TLIF graft    Jyoti Rooneyyer  4/17/2024    Pre-op Diagnosis:   Degenerative disc disease, lumbar [M51.36]       Post-Op Diagnosis Codes:     * Degenerative disc disease, lumbar [M51.36]    Procedure/CPT® Codes:        Procedure(s):  DIRECT LATERAL FUSION L3/4              Surgeon(s):  Shin Rojo DO    Anesthesia: General    Staff:   Circulator: Pasha Covarrubias RN; Flori Mccarty RN  Scrub Person: Courtney Jackson; Emily Mondragon; Rey Clark; Mallika Eubanks  Vendor Representative: Ruddy Bravo Matt         Estimated Blood Loss: minimal    Urine Voided: * No values recorded between 4/17/2024  1:59 PM and 4/17/2024  5:20 PM *    Specimens:                None          Drains:   [REMOVED] Urethral Catheter Silicone 16 Fr. (Removed)   Daily Indications Selected surgeries ( tract, abdomen) 03/28/24 2050   Site Assessment Clean;Skin intact 03/28/24 2050   Collection Container Standard drainage bag 03/28/24 2050   Securement Method Securing device 03/28/24 2050   Output (mL) 500 mL 03/29/24 0344       [REMOVED] Urethral Catheter Silicone 16 Fr. (Removed)       Findings: Lateral extrusion TLIF graft        Complications: None          Shin Rojo DO     Date: 4/17/2024  Time: 17:26 CDT

## 2024-04-17 NOTE — ED PROVIDER NOTES
Subjective   History of Present Illness  Jyoti Dick is a 8-year-old female with past medical history of hypertension and diabetes who presents to ED today with low back and pelvic pain.  Patient states that she had a fusion of her L3 and L4 with a spacer put in on March 28 and last saw Dr. Rojo, who did her surgery 5 days ago.  Patient states that since then, she is having worsening pain in her right hip going down her right leg and in her low back and pelvis.  States that she has pain and weakness in that area and is having more difficulty walking.  Patient states that she has been using a walker since the surgery to help her with ambulation and continues to use this.  Patient denies fevers, chest pain, difficulty breathing, abdominal pain, nausea/vomiting, trauma/falls.     History provided by:  Patient and spouse   used: No        Review of Systems   Constitutional:  Negative for activity change, chills and fever.   Eyes:  Negative for visual disturbance.   Respiratory:  Negative for cough, chest tightness and shortness of breath.    Cardiovascular:  Negative for chest pain and palpitations.   Gastrointestinal:  Negative for abdominal distention, abdominal pain, diarrhea, nausea and vomiting.   Genitourinary:  Negative for dysuria.   Musculoskeletal:  Positive for arthralgias (right hip pain) and back pain.   Skin:  Negative for color change, rash and wound.   Neurological:  Negative for dizziness, weakness, numbness and headaches.   Psychiatric/Behavioral:  Negative for confusion.        Past Medical History:   Diagnosis Date    Abnormal ECG     Arthritis     Cardiomyopathy     Claustrophobia     DM (diabetes mellitus)     GERD (gastroesophageal reflux disease)     HTN (hypertension)     Hypercholesteremia     Low back pain     Lumbosacral disc disease     Myocardial infarction     Peripheral neuropathy     Rib fractures     right side    Right bundle branch block        No Known  Allergies    Past Surgical History:   Procedure Laterality Date    BACK SURGERY      HEMORRHOIDECTOMY      LAMINECTOMY  07/2023    LUMBAR FUSION  2018    LUMBAR LAMINECTOMY WITH FUSION Bilateral 03/28/2024    Procedure: LUMBAR LAMINECTOMY TRANSFORAMINAL LUMBAR INTERBODY FUSION L3-4 with revision of pedicle screws from L3-S1;  Surgeon: Shin Rojo DO;  Location: [facility] OR;  Service: Neurosurgery;  Laterality: Bilateral;    SPINAL FUSION         Family History   Problem Relation Age of Onset    Heart disease Mother     Hyperlipidemia Mother     Kidney disease Mother     Miscarriages / Stillbirths Mother     Stroke Mother     Vision loss Mother     Arthritis Father     Cancer Father     Diabetes Father     Alcohol abuse Brother     Anxiety disorder Daughter     Depression Daughter        Social History     Socioeconomic History    Marital status:    Tobacco Use    Smoking status: Former     Types: Cigarettes     Passive exposure: Past    Smokeless tobacco: Never    Tobacco comments:     Social smoker   Vaping Use    Vaping status: Never Used   Substance and Sexual Activity    Alcohol use: Not Currently     Comment: Occasional/Social    Drug use: Never    Sexual activity: Not Currently     Partners: Male     Birth control/protection: Post-menopausal, None           Objective   Physical Exam  Vitals and nursing note reviewed.   Constitutional:       General: She is not in acute distress.     Appearance: Normal appearance. She is normal weight. She is not ill-appearing or toxic-appearing.   HENT:      Head: Normocephalic and atraumatic.      Mouth/Throat:      Mouth: Mucous membranes are moist.   Eyes:      Conjunctiva/sclera: Conjunctivae normal.   Cardiovascular:      Rate and Rhythm: Normal rate and regular rhythm.      Pulses: Normal pulses.      Heart sounds: Normal heart sounds.   Pulmonary:      Effort: Pulmonary effort is normal. No respiratory distress.      Breath sounds: Normal breath sounds.    Abdominal:      General: Bowel sounds are normal. There is no distension.      Palpations: Abdomen is soft.      Tenderness: There is no abdominal tenderness.   Musculoskeletal:         General: Normal range of motion.      Cervical back: Normal range of motion and neck supple.        Back:       Right hip: Normal. Tenderness present. Normal strength.      Left hip: Normal.      Comments: Pulses and sensation intact BLE; pt notes baseline neuropathy in LLE    Skin:     General: Skin is warm and dry.      Capillary Refill: Capillary refill takes less than 2 seconds.   Neurological:      General: No focal deficit present.      Mental Status: She is alert and oriented to person, place, and time.   Psychiatric:         Mood and Affect: Mood normal.         Behavior: Behavior normal.         Procedures           ED Course                                             Medical Decision Making  In summary, patient presents to ED today with low back pain, right hip pain that radiates down right leg after having back surgery on March 28.  On arrival, patient is ambulatory with walker, afebrile, and normotensive.  Evaluation included CBC, CMP, CT pelvis with and without contrast, CT lumbar spine with and without contrast, 0.5 mg Dilaudid IV.  Physical exam revealed 2 well-healed vertical surgical incisions on either side of her lumbar spine that were approximately 3 cm in length, right hip tender to palpation with no obvious deformity, redness, or bruising and pedal pulses intact.  Labs reviewed with no significant abnormalities.  CT showed no acute bony abnormalities with abnormal position of disc spacer at L3-L4.  Spoke with Dr. Rojo, neurosurgeon, who stated he would admit patient to his service and take her to the OR later today for further management of care.  Discussed these results with patient and she noted that she has not ate or drink anything since midnight and is agreeable with this plan of care.    Problems  Addressed:  Post-op pain: complicated acute illness or injury  Right-sided low back pain with right-sided sciatica, unspecified chronicity: complicated acute illness or injury    Amount and/or Complexity of Data Reviewed  Labs: ordered.  Radiology: ordered.    Risk  Prescription drug management.  Decision regarding hospitalization.        Final diagnoses:   Right-sided low back pain with right-sided sciatica, unspecified chronicity   Post-op pain       ED Disposition  ED Disposition       ED Disposition   Decision to Admit    Condition   --    Comment   Level of Care: Med/Surg [1]   Diagnosis: Post-operative pain [707272]   Admitting Physician: MELANIE TORRES [[id number]]   Attending Physician: MELANIE TORRES [[id number]]                 No follow-up provider specified.       Medication List      No changes were made to your prescriptions during this visit.            Tawny Gorman, APRN  04/17/24 1102

## 2024-04-17 NOTE — OP NOTE
DIRECT LATERAL FUSION L3/4 with removal of TLIF graft    Jyoti Dick  4/17/2024    Pre-op Diagnosis:   Degenerative disc disease, lumbar [M51.36]       Post-Op Diagnosis Codes:     * Degenerative disc disease, lumbar [M51.36]    Procedure/CPT® Codes:        Procedure(s):  DIRECT LATERAL FUSION L3/4              Surgeon(s):  Shin Rojo,     Anesthesia: General    Staff:   Circulator: Pasha Covarrubias RN; Flori Mccarty RN  Scrub Person: Courtney Jackson; Emily Mondragon; Rey Clark; Mallika Eubanks  Vendor Representative: Ruddy Bravo Matt         Estimated Blood Loss: minimal    Urine Voided: * No values recorded between 4/17/2024  1:59 PM and 4/17/2024  5:20 PM *    Specimens:                None          Drains:   [REMOVED] Urethral Catheter Silicone 16 Fr. (Removed)   Daily Indications Selected surgeries ( tract, abdomen) 03/28/24 2050   Site Assessment Clean;Skin intact 03/28/24 2050   Collection Container Standard drainage bag 03/28/24 2050   Securement Method Securing device 03/28/24 2050   Output (mL) 500 mL 03/29/24 0344       [REMOVED] Urethral Catheter Silicone 16 Fr. (Removed)       Findings: Lateral extrusion TLIF graft    Spinal Surgery Levels Completed:1 Level    Complications: None    Procedure    Patient is a 58-year-old female who had a L3/4 transforaminal lumbar interbody fusion approximately a month ago and up some new severe left leg pain.  On repeat imaging it did look like the graft had migrated laterally and anterior.  Because of this we did offer her a direct lateral interbody fusion with removal of the previously placed graft and a much larger graft to traverse the entire disc space and hopefully provide better purchase.  I did explain the risks and benefits of the procedure with her at length and she wished to proceed.  She was brought to the operative suite and put under general anesthetic, once under general anesthetic she was then placed onto the  operative table and padded at all the appropriate points with the left side up.  Once we had her padded and properly fixated to the table with the axillary roll in place we were then able to prepped and draped in a sterile fashion using a 5 Menter prep scrub sterile towels Ioban and sterile drapes.  We were then able to bring in lateral fluoroscopy and marker incision site.  Once this was done we were able to infiltrate with 1% lidocaine with epinephrine and opened with a 10 blade and dissect with blunt dissection through the lateral musculature into the retroperitoneal space.  We were then able to use the [employer] dilator system to dilate over the L3/4 interspace and then placed the retractor.  We then stimulated to make sure there were no nerves in the area and then I was able to dissect through the remaining psoas muscle to the previously placed graft.  It did appear that a bone fragment had chipped off also and was on top of the graft.  We were able to remove this with a Kerrison and then expose the graft and remove the graft with a Kocher.  Once the graft was removed we were then able to size her for a 10 x 56 degree lordotic graft which was then placed in the interspace.  Once we got AP and lateral fluoroscopy images which confirmed the location of the graft we were then able to use a lateral plate with 30 mm screws to hopefully keep the graft in place and keep it from moving laterally.  Once this was completed we irrigated with copious amounts of antibiotic saline and got meticulous hemostasis with the bipolar cautery.  We then closed the deep muscle layers with 2-0 Vicryl and then the skin with 2-0 Vicryl Mastisol Steri-Strips Telfa and Tegaderm.  Patient went to postoperative recovery in stable condition.

## 2024-04-18 VITALS
DIASTOLIC BLOOD PRESSURE: 50 MMHG | SYSTOLIC BLOOD PRESSURE: 116 MMHG | HEIGHT: 65 IN | TEMPERATURE: 97.9 F | RESPIRATION RATE: 16 BRPM | BODY MASS INDEX: 29.31 KG/M2 | WEIGHT: 175.9 LBS | OXYGEN SATURATION: 97 % | HEART RATE: 88 BPM

## 2024-04-18 PROCEDURE — G0378 HOSPITAL OBSERVATION PER HR: HCPCS

## 2024-04-18 PROCEDURE — 99024 POSTOP FOLLOW-UP VISIT: CPT | Performed by: PHYSICIAN ASSISTANT

## 2024-04-18 PROCEDURE — 97161 PT EVAL LOW COMPLEX 20 MIN: CPT | Performed by: PHYSICAL THERAPIST

## 2024-04-18 RX ORDER — CEPHALEXIN 500 MG/1
500 CAPSULE ORAL EVERY 6 HOURS
Qty: 4 CAPSULE | Refills: 0 | Status: SHIPPED | OUTPATIENT
Start: 2024-04-18

## 2024-04-18 RX ORDER — NALOXONE HYDROCHLORIDE 4 MG/.1ML
1 SPRAY NASAL AS NEEDED
COMMUNITY

## 2024-04-18 RX ORDER — AMOXICILLIN 250 MG
2 CAPSULE ORAL 2 TIMES DAILY
Status: DISCONTINUED | OUTPATIENT
Start: 2024-04-18 | End: 2024-04-18 | Stop reason: HOSPADM

## 2024-04-18 RX ORDER — OXYCODONE AND ACETAMINOPHEN 10; 325 MG/1; MG/1
1 TABLET ORAL EVERY 6 HOURS PRN
COMMUNITY

## 2024-04-18 RX ADMIN — GABAPENTIN 900 MG: 300 CAPSULE ORAL at 13:04

## 2024-04-18 RX ADMIN — PANTOPRAZOLE SODIUM 40 MG: 40 TABLET, DELAYED RELEASE ORAL at 05:15

## 2024-04-18 RX ADMIN — OXYCODONE AND ACETAMINOPHEN 1 TABLET: 325; 10 TABLET ORAL at 10:39

## 2024-04-18 RX ADMIN — HYDROCHLOROTHIAZIDE 25 MG: 25 TABLET ORAL at 10:07

## 2024-04-18 RX ADMIN — CEPHALEXIN 500 MG: 500 CAPSULE ORAL at 05:14

## 2024-04-18 RX ADMIN — CARVEDILOL 12.5 MG: 6.25 TABLET, FILM COATED ORAL at 10:07

## 2024-04-18 RX ADMIN — LOSARTAN POTASSIUM 50 MG: 50 TABLET, FILM COATED ORAL at 10:07

## 2024-04-18 RX ADMIN — CEPHALEXIN 500 MG: 500 CAPSULE ORAL at 13:04

## 2024-04-18 RX ADMIN — GABAPENTIN 900 MG: 300 CAPSULE ORAL at 05:15

## 2024-04-18 RX ADMIN — DOCUSATE SODIUM AND SENNOSIDES 2 TABLET: 8.6; 5 TABLET, FILM COATED ORAL at 10:07

## 2024-04-18 RX ADMIN — OXYCODONE AND ACETAMINOPHEN 1 TABLET: 325; 10 TABLET ORAL at 04:25

## 2024-04-18 NOTE — DISCHARGE INSTRUCTIONS
No lifting > 10 pounds for total 6 months. Brace when standing and walking.  No bending and and twisting  No tub baths or swimming until incision is completely healed.  Walking is encouraged.  You can drive if you have not had pain medication within previous 4 hours.  Shower in 72 hours and remove dressing.  Can leave open to air.  Cover with dry dressing as needed.  Call the office if you notice any redness or purulent drainage or fever greater than 101.  Call the office for any worsening of pain, calf pain and/or swelling or new weakness or other concerns.

## 2024-04-18 NOTE — PLAN OF CARE
Goal Outcome Evaluation: Patient medicated once for complaints of pain. Patient was sitting up in bedside chair , nurse was called into patients room by spouse.  stated that patient felt nauseated. Nurse checked patients blood pressure and systolic blood pressure was in the 70s. Patients feet were elevated and patient was given IV fluids.  Patient was put back in bed. Blood pressure was checked again and blood pressure was 120/60. Patient to be discharged this shift. Provider aware of near syncopal episode. Patient safety to be maintained this shift, continue to monitor and report abnormal to provider .

## 2024-04-18 NOTE — NURSING NOTE
Received pt from PACU, RA sat at 100%, IV's flushed and locked, spouse at bedside, report given to oncoming shift.

## 2024-04-18 NOTE — DISCHARGE SUMMARY
Date of Discharge:  4/18/2024    Discharge Diagnosis: Degenerative disc disease, lumbar    Presenting Problem/History of Present Illness  Post-operative pain [G89.18]  Scoliosis [M41.9]       Hospital Course  Patient is a 58 y.o. female presented with new onset left leg pain which was severe status post L3-4 TLIF approximately 1 month prior.  Fusion interbody graft had migrated per imaging and she was taken back to the OR to revise graft with a larger size to provide better purchase.  Postop day #1, patient had no leg pain and postoperative back pain was controlled with oral medication.  She was tolerating po intake and voiding without issues. Ambulation was steady with walker. She verbalized wish to be discharged home in the care of her .  It was felt that she was stable enough for safe discharge.    Procedures Performed  Procedure(s):  DIRECT LATERAL FUSION L3/4       Consults:   Consults       No orders found for last 30 day(s).            Pertinent Test Results:  none    Condition on Discharge:  Good    Vital Signs  Temp:  [97.3 °F (36.3 °C)-98.3 °F (36.8 °C)] 97.9 °F (36.6 °C)  Heart Rate:  [65-88] 88  Resp:  [16-18] 16  BP: ()/(50-82) 116/50    Physical Exam:   Physical Exam  Constitutional:       Appearance: She is well-developed.   HENT:      Head: Normocephalic.   Eyes:      Pupils: Pupils are equal, round, and reactive to light.   Cardiovascular:      Rate and Rhythm: Normal rate.   Pulmonary:      Effort: Pulmonary effort is normal.   Musculoskeletal:         General: Normal range of motion.      Cervical back: Normal range of motion.   Skin:     General: Skin is warm.   Neurological:      Mental Status: She is alert and oriented to person, place, and time.      GCS: GCS eye subscore is 4. GCS verbal subscore is 5. GCS motor subscore is 6.      Cranial Nerves: No cranial nerve deficit.      Sensory: Sensory deficit present.      Motor: No weakness.      Gait: Gait is intact. Gait normal.       Deep Tendon Reflexes: Reflexes are normal and symmetric.   Psychiatric:         Speech: Speech normal.         Behavior: Behavior normal.         Thought Content: Thought content normal.          Neurologic Exam     Mental Status   Oriented to person, place, and time.   Speech: speech is normal   Level of consciousness: alert  Knowledge: good.     Cranial Nerves     CN III, IV, VI   Pupils are equal, round, and reactive to light.    Motor Exam     Strength   Right iliopsoas: 5/5  Left iliopsoas: 5/5  Right quadriceps: 5/5  Left quadriceps: 5/5  Right hamstrin/5  Left hamstrin/5  Right anterior tibial: 5/5  Left anterior tibial: 5/5  Right posterior tibial: 5/5  Left posterior tibial: 5/5  Right gastroc: 5/5  Left gastroc: 5/5No EHL weakness     Sensory Exam   Decreased sensation to light touch left anterior thigh     Gait, Coordination, and Reflexes     Gait  Gait: normal         Discharge Disposition  Home or Self Care    Discharge Medications     Discharge Medications        New Medications        Instructions Start Date   cephalexin 500 MG capsule  Commonly known as: KEFLEX   500 mg, Oral, Every 6 Hours             Continue These Medications        Instructions Start Date   acetaminophen 500 MG tablet  Commonly known as: TYLENOL   1,000 mg, Oral, Every 6 Hours PRN      ALPHA-D-GALACTOSIDASE PO   1 tablet, Oral, Every Morning      atorvastatin 10 MG tablet  Commonly known as: LIPITOR   10 mg, Oral, Nightly      calcium carbonate 500 MG chewable tablet  Commonly known as: TUMS   1 tablet, Oral, 2 Times Daily      carvedilol 12.5 MG tablet  Commonly known as: COREG   12.5 mg, Oral, 2 Times Daily With Meals      esomeprazole 20 MG capsule  Commonly known as: nexIUM   40 mg, Oral, 2 Times Daily      ferrous sulfate 140 (45 Fe) MG tablet controlled-release tablet   140 mg, Oral, Daily With Breakfast      FreeStyle Aida 2 Sensor misc   USE TO CHECK BLOOD GLUCOSE 4 TIMES DAILY      gabapentin 300 MG  capsule  Commonly known as: NEURONTIN   900 mg, Oral, 3 Times Daily      hydroCHLOROthiazide 25 MG tablet   25 mg, Oral, Daily      ketorolac 10 MG tablet  Commonly known as: TORADOL   10 mg, Oral, Every 6 Hours PRN      LACTOBACILLUS RHAMNOSUS (GG) PO   1 capsule, Oral, Daily      losartan 50 MG tablet  Commonly known as: COZAAR   50 mg, Oral, Daily      magnesium chloride ER 64 MG DR tablet   Oral, Daily      naloxone 4 MG/0.1ML nasal spray  Commonly known as: NARCAN   Call 911. Don't prime. [place] in 1 nostril for overdose. Repeat in 2-3 minutes in other nostril if no or minimal breathing/responsiveness.      nortriptyline 10 MG capsule  Commonly known as: PAMELOR   40 mg, Oral, Nightly      oxyCODONE 5 MG immediate release tablet  Commonly known as: ROXICODONE   5 mg, Oral, Every 6 Hours PRN      Ozempic (0.25 or 0.5 MG/DOSE) 2 MG/3ML solution pen-injector  Generic drug: Semaglutide(0.25 or 0.5MG/DOS)   0.25 mg, Subcutaneous, Weekly      polyethylene glycol 17 g packet  Commonly known as: MIRALAX   MIX 1 PACKET IN 4 TO 8 OUNCES OF LIQUID AND TAKE BY MOUTH DAILY      propranolol LA 80 MG 24 hr capsule  Commonly known as: INDERAL LA   80 mg, Oral, Daily      sennosides-docusate 8.6-50 MG per tablet  Commonly known as: PERICOLACE   1 tablet, Oral      vitamin B-12 1000 MCG tablet  Commonly known as: CYANOCOBALAMIN   1,000 mcg, Oral, Daily             Stop These Medications      methylPREDNISolone 4 MG dose pack  Commonly known as: MEDROL              Discharge Diet:   Diet Instructions       Diet: Regular/House Diet; Regular Texture (IDDSI 7); Thin (IDDSI 0)      Discharge Diet: Regular/House Diet    Texture: Regular Texture (IDDSI 7)    Fluid Consistency: Thin (IDDSI 0)            Activity at Discharge:   Activity Instructions       Other Instructions (Specify)      Activity Instructions: no strenuous activity. Wear brace when out of bed or sitting up.  No lifting greater than 10 pounds            Follow-up  Appointments  Future Appointments   Date Time Provider Department Center   5/13/2024 10:00 AM Shin Rojo, DO [facility]     Additional Instructions for the Follow-ups that You Need to Schedule       Call MD With Problems / Concerns   As directed      Call with worsening back or leg pain, drainage from wound, fever, or difficulty walking    Order Comments: Call with worsening back or leg pain, drainage from wound, fever, or difficulty walking         Discharge Follow-up with Specialty: Sterling Ashford PA-C; 2 Weeks   As directed      Specialty: Sterling Ashford PA-C   Follow Up: 2 Weeks                Test Results Pending at Discharge       Sterling Ashford PA-C  04/18/24  15:24 CDT    Time: Discharge 30 min

## 2024-04-18 NOTE — PROGRESS NOTES
"Jyoti Dick  58 y.o.      Chief complaint:   Back pain    Subjective:  Symptoms:  Stable.  No chest pain, weakness, headache, chest pressure or diarrhea.    Diet:  Adequate intake.  No nausea or vomiting.    Activity level: Returning to normal.    Pain:  She complains of pain that is moderate.  Pain is requiring pain medication and well controlled.      Patient was seen for secondary rounds at 2:45 PM today.  She ambulated well with physical therapy but tried to get up earlier and felt faint.  She did not pass out.  She did get sweaty and her blood pressure dropped to 80 systolic.  She was given fluid challenge and currently is resting and states she feels fine.  She states that she normally takes her blood pressure medicines in divided doses and feels that this contributed to the symptoms since she was giving all of her meds at 1 time.  Her pain is currently about a 5.  She has no leg pain and she wants to go home.  Her  is at bedside.    Temp:  [97.3 °F (36.3 °C)-98.3 °F (36.8 °C)] 97.9 °F (36.6 °C)  Heart Rate:  [65-88] 88  Resp:  [16-18] 16  BP: ()/(50-82) 116/50      Objective:  General Appearance:  Comfortable.    Vital signs: (most recent): Blood pressure 116/50, pulse 88, temperature 97.9 °F (36.6 °C), temperature source Oral, resp. rate 16, height 165.1 cm (65\"), weight 79.8 kg (175 lb 14.4 oz), SpO2 97%.  Vital signs are normal.  No fever.    Output: Producing urine and no stool output (Passing gas).    Lungs:  Normal effort.    Heart: Normal rate.    Extremities: Normal range of motion.    Skin:  Dry.    Pulses: Distal pulses are intact.        Neurologic Exam     Mental Status   Oriented to person, place, and time.   Level of consciousness: alert  Knowledge: good.     Motor Exam Moves all 4 extremities with strength 5 out of 5 bilateral lower extremities.       Lab Results (last 24 hours)       Procedure Component Value Units Date/Time    POC Glucose Once [[id number]]  (Abnormal) " Collected: 04/17/24 1728    Specimen: Blood Updated: 04/17/24 1739     Glucose 172 mg/dL      Comment: : [id number] Bryson Judgeter ID: [id number]               I observed patient in the room get herself out of bed with the brace on and ambulate to the bathroom with steady gait.    Plan:   At this point, I feel that Juani can be safely discharged home with follow-up in the office in 2 weeks.  She has narcotic pain relievers on hand at home as well as Toradol.  She will continue brace when standing and walking with 10 pound lifting restriction and avoid bending and twisting.  Full set of discharge restrictions will be listed in discharge instructions.      Degenerative disc disease, lumbar    Post-operative pain    Scoliosis        Arelisia ELLY Ashford PA-C

## 2024-04-18 NOTE — ANESTHESIA POSTPROCEDURE EVALUATION
"Patient: Jyoti Dick    Procedure Summary       Date: 04/17/24 Room / Location:  [facility] OR  /  [facility]    Anesthesia Start: 1359 Anesthesia Stop: 1725    Procedure: DIRECT LATERAL FUSION L3/4 (Bilateral: Back) Diagnosis:       Degenerative disc disease, lumbar      (Degenerative disc disease, lumbar [M51.36])    Surgeons: Shin Rojo DO Provider: Robert Lara CRNA    Anesthesia Type: general ASA Status: 2 - Emergent            Anesthesia Type: general    Vitals  Vitals Value Taken Time   /71 04/17/24 1845   Temp 97.5 °F (36.4 °C) 04/17/24 1845   Pulse 68 04/17/24 1850   Resp 16 04/17/24 1845   SpO2 100 % 04/17/24 1850   Vitals shown include unfiled device data.        Post Anesthesia Care and Evaluation    Patient location during evaluation: PACU  Patient participation: complete - patient participated  Level of consciousness: awake and alert  Pain management: adequate    Airway patency: patent  Anesthetic complications: No anesthetic complications    Cardiovascular status: acceptable  Respiratory status: acceptable  Hydration status: acceptable    Comments: Blood pressure 135/66, pulse 79, temperature 98.3 °F (36.8 °C), temperature source Oral, resp. rate 16, height 165.1 cm (65\"), weight 79.8 kg (175 lb 14.4 oz), SpO2 99%.    Pt discharged from PACU based on tita score >8    "

## 2024-04-18 NOTE — PLAN OF CARE
Problem: Adult Inpatient Plan of Care  Goal: Plan of Care Review  Outcome: Ongoing, Progressing  Flowsheets (Taken 4/18/2024 0916)  Progress: improving  Plan of Care Reviewed With: patient  Outcome Evaluation: A/O, VSS, RA. Pt c/o pain, prn pain medication given with relief. Ice pack applied, pt stated relief of pain. Pt had good UOP, purewick in place, pt stated she did not want to get up until PT came to see her. Pt appeared to rest well overnight. Safety maintained.

## 2024-04-18 NOTE — THERAPY EVALUATION
Patient Name: Jyoti Dick  : 1965    MRN: [id number]                              Today's Date: 2024       Admit Date: 2024    Visit Dx:     ICD-10-CM ICD-9-CM   1. Right-sided low back pain with right-sided sciatica, unspecified chronicity  M54.41 724.3   2. Post-op pain  G89.18 338.18   3. Degenerative disc disease, lumbar  M51.36 722.52     Patient Active Problem List   Diagnosis    Degenerative disc disease, lumbar    Lumbar radiculopathy    Spondylolisthesis    Nonsmoker    Overweight with body mass index (BMI) 25.0-29.9    Post-operative pain    Scoliosis     Past Medical History:   Diagnosis Date    Abnormal ECG     Arthritis     Cardiomyopathy     Claustrophobia     DM (diabetes mellitus)     GERD (gastroesophageal reflux disease)     HTN (hypertension)     Hypercholesteremia     Low back pain     Lumbosacral disc disease     Myocardial infarction     Peripheral neuropathy     Rib fractures     right side    Right bundle branch block      Past Surgical History:   Procedure Laterality Date    BACK SURGERY      DIRECT LATERAL FUSION WITH POSTERIOR PEDICLE SCREW Bilateral 2024    Procedure: DIRECT LATERAL FUSION L3/4;  Surgeon: Shin Rojo DO;  Location:  [facility];  Service: Neurosurgery;  Laterality: Bilateral;    HEMORRHOIDECTOMY      LAMINECTOMY  2023    LUMBAR FUSION  2018    LUMBAR LAMINECTOMY WITH FUSION Bilateral 2024    Procedure: LUMBAR LAMINECTOMY TRANSFORAMINAL LUMBAR INTERBODY FUSION L3-4 with revision of pedicle screws from L3-S1;  Surgeon: Shin Rojo DO;  Location:  [facility];  Service: Neurosurgery;  Laterality: Bilateral;    SPINAL FUSION        General Information       Row Name 24 0938          Physical Therapy Time and Intention    Document Type evaluation  lateral fusion L3/4 , L3/4 fusion 3/28/24 PMH: HTN, T2Dm, MI, arthritis, CHF  -MS (r) CH (t) MS (c)     Mode of Treatment individual therapy;physical therapy  -MS (r) CH (t) MS  (c)       Row Name 04/18/24 0938          General Information    Patient Profile Reviewed yes  -MS (r) CH (t) MS (c)     Prior Level of Function independent:;all household mobility;transfer;bed mobility;bathing;min assist:;dressing;home management  ambulates with rollator at baseline  -MS (r) CH (t) MS (c)     Existing Precautions/Restrictions brace worn when out of bed;fall;LSO;spinal  -MS (r) CH (t) MS (c)     Barriers to Rehab previous functional deficit;physical barrier  -MS (r) CH (t) MS (c)       Row Name 04/18/24 0938          Living Environment    People in Home spouse  -MS (r) CH (t) MS (c)       Row Name 04/18/24 0938          Home Main Entrance    Number of Stairs, Main Entrance three  -MS (r) CH (t) MS (c)     Stair Railings, Main Entrance railing on left side (ascending)  -MS (r) CH (t) MS (c)       Row Name 04/18/24 0938          Stairs Within Home, Primary    Number of Stairs, Within Home, Primary none  -MS (r) CH (t) MS (c)       Row Name 04/18/24 0938          Cognition    Orientation Status (Cognition) oriented x 4  -MS (r) CH (t) MS (c)       Row Name 04/18/24 0938          Safety Issues, Functional Mobility    Impairments Affecting Function (Mobility) balance;pain;sensation/sensory awareness;strength  -MS (r) CH (t) MS (c)               User Key  (r) = Recorded By, (t) = Taken By, (c) = Cosigned By      Initials Name Provider Type    Radhika Dyer R, PT, DPT, NCS Physical Therapist    CH Claudia Frias, NICKI Student PT Student                   Mobility       Row Name 04/18/24 0938          Bed Mobility    Bed Mobility rolling left;sidelying-sit  -MS (r) CH (t) MS (c)     Rolling Left [place] (Bed Mobility) modified independence;verbal cues  -MS (r) CH (t) MS (c)     Sidelying-Sit [place] (Bed Mobility) modified independence  -MS (r) CH (t) MS (c)     Assistive Device (Bed Mobility) bed rails;head of bed elevated  -MS (r) CH (t) MS (c)       Row Name 04/18/24 0938          Sit-Stand  Transfer    Sit-Stand [place] (Transfers) standby assist  -MS (r) CH (t) MS (c)     Assistive Device (Sit-Stand Transfers) walker, front-wheeled  -MS (r) CH (t) MS (c)       Row Name 04/18/24 0938          Gait/Stairs (Locomotion)    [place] Level (Gait) standby assist  -MS (r) CH (t) MS (c)     Assistive Device (Gait) walker, front-wheeled  -MS (r) CH (t) MS (c)     Patient was able to Ambulate yes  -MS (r) CH (t) MS (c)     Distance in Feet (Gait) 150  -MS (r) CH (t) MS (c)     Deviations/Abnormal Patterns (Gait) base of support, wide;gait speed decreased  -MS (r) CH (t) MS (c)     Bilateral Gait Deviations forward flexed posture  -MS (r) CH (t) MS (c)     [place] Level (Stairs) contact guard  -MS (r) CH (t) MS (c)     Handrail Location (Stairs) left side (ascending);right side (descending)  -MS (r) CH (t) MS (c)     Number of Steps (Stairs) 4  -MS (r) CH (t) MS (c)     Ascending Technique (Stairs) step-to-step  -MS (r) CH (t) MS (c)     Descending Technique (Stairs) step-to-step  -MS (r) CH (t) MS (c)     Stairs, Impairments strength decreased;pain  -MS (r) CH (t) MS (c)               User Key  (r) = Recorded By, (t) = Taken By, (c) = Cosigned By      Initials Name Provider Type    MS Henry Radhika R, PT, DPT, NCS Physical Therapist    CH Claudia Frias, NICKI Student PT Student                   Obj/Interventions       Row Name 04/18/24 0938          Range of Motion Comprehensive    Comment, General Range of Motion L hip flexion AROM: unable PROM: full  -MS (r) CH (t) MS (c)       Row Name 04/18/24 0938          Strength Comprehensive (MMT)    Comment, General Manual Muscle Testing (MMT) Assessment L hip flexion 2/5, L knee extension 4/5  -MS (r) CH (t) MS (c)       Row Name 04/18/24 0938          Balance    Balance Assessment sitting static balance;sitting dynamic balance;standing static balance;standing dynamic balance  -MS (r) CH (t) MS (c)     Static Sitting Balance independent  -MS (r) CH (t) MS  (c)     Dynamic Sitting Balance independent  -MS (r) CH (t) MS (c)     Position, Sitting Balance unsupported;sitting edge of bed  -MS (r) CH (t) MS (c)     Static Standing Balance contact guard  -MS (r) CH (t) MS (c)     Dynamic Standing Balance contact guard  -MS (r) CH (t) MS (c)     Position/Device Used, Standing Balance supported;walker, front-wheeled  -MS (r) CH (t) MS (c)       Row Name 04/18/24 0938          Sensory Assessment (Somatosensory)    Sensory Assessment (Somatosensory) UE sensation intact;left LE  -MS (r) CH (t) MS (c)     Left LE Sensory Assessment light touch awareness;impaired;deep pressure awareness;intact  -MS (r) CH (t) MS (c)               User Key  (r) = Recorded By, (t) = Taken By, (c) = Cosigned By      Initials Name Provider Type    Radhiak Dyer R, PT, DPT, NCS Physical Therapist    CH Claudia Frias, PT Student PT Student                   Goals/Plan    No documentation.                  Clinical Impression       Row Name 04/18/24 0938          Pain    Pretreatment Pain Rating 6/10  -MS (r) CH (t) MS (c)     Posttreatment Pain Rating 8/10  -MS (r) CH (t) MS (c)     Pain Location incisional  -MS (r) CH (t) MS (c)     Pain Location - back  -MS (r) CH (t) MS (c)     Pain Intervention(s) Medication (See MAR);Repositioned;Ambulation/increased activity;Rest  -MS (r) CH (t) MS (c)       Row Name 04/18/24 0938          Plan of Care Review    Plan of Care Reviewed With patient;spouse  -MS (r) CH (t) MS (c)     Progress no change  -MS (r) CH (t) MS (c)     Outcome Evaluation Pt A&Ox4 lying in bed with  in room, complaining of 6/10 back pain. PLOF reported as I for most ADLs requiring min A for dressing and home management. Pt ambulates with rollator and is limited in distance she can walk. Pt educated on spinal precautions and reviewed brace management. Pt completes bed mobility mod I. MMT L hip flexion 2/5, L knee extension 4/5 with absent light touch sensation on L thigh but intact  to deep presure. Pt completes sit to stand and ambulates SBA with walker. Pt fearful of ambulating without walker because of balance and did not want to trial ambulating with therapist assist only. Pt ambulates with wide SELMA but shows no increased sway. Pt ascending and descending stairs CGA facing the railing demonstrating good function and balance with adapted technique. Addressed pt questions and discussed outpatient PT. Skilled PT not required at this time.  -MS (r) CH (t) MS (c)       Row Name 04/18/24 0938          Therapy Assessment/Plan (PT)    Rehab Potential (PT) --  -MS (r) CH (t) MS (c)     Criteria for Skilled Interventions Met (PT) does not meet criteria for skilled intervention  -MS (r) CH (t) MS (c)     Therapy Frequency (PT) evaluation only  -MS (r) CH (t) MS (c)       Row Name 04/18/24 0938          Vital Signs    O2 Delivery Pre Treatment room air  -MS (r) CH (t) MS (c)     Pre Patient Position Supine  -MS (r) CH (t) MS (c)     Intra Patient Position Standing  -MS (r) CH (t) MS (c)     Post Patient Position Sitting  -MS (r) CH (t) MS (c)       Row Name 04/18/24 0938          Positioning and Restraints    Pre-Treatment Position in bed  -MS (r) CH (t) MS (c)     Post Treatment Position chair  -MS (r) CH (t) MS (c)     In Chair sitting;call light within reach;encouraged to call for assist;with family/caregiver;with brace  -MS (r) CH (t) MS (c)               User Key  (r) = Recorded By, (t) = Taken By, (c) = Cosigned By      Initials Name Provider Type    Radhika Dyer R, PT, DPT, NCS Physical Therapist    CH Claudia Frias, NICKI Student PT Student                   Outcome Measures       Row Name 04/18/24 0938 04/18/24 0750       How much help from another person do you currently need...    Turning from your back to your side while in flat bed without using bedrails? 4  -MS (r) CH (t) MS (c) 4  -AO    Moving from lying on back to sitting on the side of a flat bed without bedrails? 4  -MS (r) CH (t)  MS (c) 4  -AO    Moving to and from a bed to a chair (including a wheelchair)? 4  -MS (r) CH (t) MS (c) 4  -AO    Standing up from a chair using your arms (e.g., wheelchair, bedside chair)? 4  -MS (r) CH (t) MS (c) 4  -AO    Climbing 3-5 steps with a railing? 3  -MS (r) CH (t) MS (c) 3  -AO    To walk in hospital room? 4  -MS (r) CH (t) MS (c) 4  -AO    AM-PAC 6 Clicks Score (PT) 23  -MS (r) CH (t) 23  -AO    Highest Level of Mobility Goal 7 --> Walk 25 feet or more  -MS (r) CH (t) 7 --> Walk 25 feet or more  -AO      Row Name 04/18/24 0938          Functional Assessment    Outcome Measure Options AM-PAC 6 Clicks Basic Mobility (PT)  -MS (r) CH (t) MS (c)               User Key  (r) = Recorded By, (t) = Taken By, (c) = Cosigned By      Initials Name Provider Type    Radhika Dyer R, PT, DPT, NCS Physical Therapist    Jo Ann Joseph, RN Registered Nurse    CH Claudia Frias, NICKI Student PT Student                                 Physical Therapy Education       Title: PT OT SLP Therapies (In Progress)       Topic: Physical Therapy (In Progress)       Point: Mobility training (Done)       Learning Progress Summary             Patient Acceptance, E, SHAWN,DU by  at 4/18/2024 1140    Comment: Pt educated on the role of PT in her care, on spinal precautions, brace management, and completing mobility safely and efficiently                         Point: Home exercise program (Not Started)       Learner Progress:  Not documented in this visit.              Point: Body mechanics (Not Started)       Learner Progress:  Not documented in this visit.              Point: Precautions (Done)       Learning Progress Summary             Patient Acceptance, E, VU,DU by  at 4/18/2024 1140    Comment: Pt educated on the role of PT in her care, on spinal precautions, brace management, and completing mobility safely and efficiently                                         User Key       Initials Effective Dates Name Provider Type  Discipline     02/22/24 -  Claudia Frias, PT Student PT Student PT                  PT Recommendation and Plan     Plan of Care Reviewed With: patient, spouse  Progress: no change  Outcome Evaluation: Pt A&Ox4 lying in bed with  in room, complaining of 6/10 back pain. PLOF reported as I for most ADLs requiring min A for dressing and home management. Pt ambulates with rollator and is limited in distance she can walk. Pt educated on spinal precautions and reviewed brace management. Pt completes bed mobility mod I. MMT L hip flexion 2/5, L knee extension 4/5 with absent light touch sensation on L thigh but intact to deep presure. Pt completes sit to stand and ambulates SBA with walker. Pt fearful of ambulating without walker because of balance and did not want to trial ambulating with therapist assist only. Pt ambulates with wide SELMA but shows no increased sway. Pt ascending and descending stairs CGA facing the railing demonstrating good function and balance with adapted technique. Addressed pt questions and discussed outpatient PT. Skilled PT not required at this time.     Time Calculation:         PT Charges       Row Name 04/18/24 0938             Time Calculation    Start Time 0938  -MS (r) CH (t) MS (c)      Stop Time 1022  -MS (r) CH (t) MS (c)      Time Calculation (min) 44 min  -MS (r) CH (t)      PT Received On 04/18/24  -MS (r) CH (t) MS (c)         Untimed Charges    PT Eval/Re-eval Minutes 54  10 minutes chart review  -MS (r) CH (t) MS (c)         Total Minutes    Untimed Charges Total Minutes 54  -MS (r) CH (t)       Total Minutes 54  -MS (r) CH (t)                User Key  (r) = Recorded By, (t) = Taken By, (c) = Cosigned By      Initials Name Provider Type    MS Henry Radhika R, PT, DPT, NCS Physical Therapist     Claudia Frias, PT Student PT Student                      PT G-Codes  Outcome Measure Options: AM-PAC 6 Clicks Basic Mobility (PT)  AM-PAC 6 Clicks Score (PT): 23  PT Discharge  Summary  Anticipated Discharge Disposition (PT): home with assist, home with outpatient therapy services    Claudia Frias, PT Student  4/18/2024

## 2024-04-18 NOTE — PLAN OF CARE
Goal Outcome Evaluation:  Plan of Care Reviewed With: patient, spouse        Progress: no change  Outcome Evaluation: Pt A&Ox4 lying in bed with  in room, complaining of 6/10 back pain. PLOF reported as I for most ADLs requiring min A for dressing and home management. Pt ambulates with rollator and is limited in distance she can walk. Pt educated on spinal precautions and reviewed brace management. Pt completes bed mobility mod I. MMT L hip flexion 2/5, L knee extension 4/5 with absent light touch sensation on L thigh but intact to deep presure. Pt completes sit to stand and ambulates SBA with walker. Pt fearful of ambulating without walker because of balance and did not want to trial ambulating with therapist assist only. Pt ambulates with wide SELMA but shows no increased sway. Pt ascending and descending stairs CGA facing the railing demonstrating good function and balance with adapted technique. Addressed pt questions and discussed outpatient PT. Skilled PT not required at this time.      Anticipated Discharge Disposition (PT): home with assist, home with outpatient therapy services

## 2024-04-18 NOTE — PROGRESS NOTES
"Jyoti Dick  58 y.o.      Chief complaint:   Back pain    Subjective:  Symptoms:  Stable.  No shortness of breath, malaise, chest pain, weakness, headache, chest pressure or anxiety.    Diet:  Adequate intake.  No nausea or vomiting.    Activity level: Impaired due to pain.    Pain:  She complains of pain that is moderate.  Pain is requiring pain medication.      Juani is POD #1 S/P Direct Lateral Fusion L3/4.  Overnight she had expected surgical site pain but indicates her leg pain has resolved.  She does report new numbness left anterior thigh but states she is able to move everything okay.  She denies headache as well as nausea and vomiting.  She has not yet ambulated with physical therapy.    Temp:  [97.3 °F (36.3 °C)-98.3 °F (36.8 °C)] 98.3 °F (36.8 °C)  Heart Rate:  [65-79] 68  Resp:  [16-18] 16  BP: ()/(57-82) 114/72      Objective:  General Appearance:  Comfortable and in no acute distress.    Vital signs: (most recent): Blood pressure 114/72, pulse 68, temperature 98.3 °F (36.8 °C), temperature source Oral, resp. rate 16, height 165.1 cm (65\"), weight 79.8 kg (175 lb 14.4 oz), SpO2 99%.  Vital signs are normal.  No fever.    Output: Producing urine.    Lungs:  Normal effort.    Heart: Normal rate.    Extremities: Normal range of motion.    Skin:  Warm and dry.    Pulses: Distal pulses are intact.        Neurologic Exam     Mental Status   Oriented to person, place, and time.   Level of consciousness: alert  Knowledge: good.     Motor Exam   Muscle bulk: normal  Overall muscle tone: normal    Strength   Right iliopsoas: 5/5  Left iliopsoas: 5/5  Right quadriceps: 5/5  Left quadriceps: 5/5  Right hamstrin/5  Left hamstrin/5  Right anterior tibial: 5/5  Left anterior tibial: 5/5  Right posterior tibial: 5/5  Left posterior tibial: 5/5  Right gastroc: 5/5  Left gastroc: 5/5No EHL weakness     Sensory Exam   Decreased sensation to light touch left anterior mid thigh     Gait, " Coordination, and Reflexes Gait not tested.  Has not been up with PT       Lab Results (last 24 hours)       Procedure Component Value Units Date/Time    POC Glucose Once [[id number]]  (Abnormal) Collected: 04/17/24 1728    Specimen: Blood Updated: 04/17/24 1739     Glucose 172 mg/dL      Comment: : [id number] Bryson TinaMeter ID: [id number]       Comprehensive Metabolic Panel [[id number]]  (Abnormal) Collected: 04/17/24 0919    Specimen: Blood Updated: 04/17/24 0956     Glucose 153 mg/dL      BUN 21 mg/dL      Creatinine 0.40 mg/dL      Sodium 136 mmol/L      Potassium 3.9 mmol/L      Chloride 100 mmol/L      CO2 28.0 mmol/L      Calcium 9.1 mg/dL      Total Protein 6.5 g/dL      Albumin 4.4 g/dL      ALT (SGPT) 13 U/L      AST (SGOT) 10 U/L      Alkaline Phosphatase 142 U/L      Total Bilirubin 0.3 mg/dL      Globulin 2.1 gm/dL      A/G Ratio 2.1 g/dL      BUN/Creatinine Ratio 52.5     Anion Gap 8.0 mmol/L      eGFR 114.2 mL/min/1.73     Narrative:      GFR Normal >60  Chronic Kidney Disease <60  Kidney Failure <15      CBC & Differential [[id number]]  (Abnormal) Collected: 04/17/24 0919    Specimen: Blood Updated: 04/17/24 0938    Narrative:      The following orders were created for panel order CBC & Differential.  Procedure                               Abnormality         Status                     ---------                               -----------         ------                     CBC Auto Differential[[id number]]        Abnormal            Final result                 Please view results for these tests on the individual orders.    CBC Auto Differential [[id number]]  (Abnormal) Collected: 04/17/24 0919    Specimen: Blood Updated: 04/17/24 0938     WBC 8.54 10*3/mm3      RBC 4.08 10*6/mm3      Hemoglobin 12.9 g/dL      Hematocrit 39.1 %      MCV 95.8 fL      MCH 31.6 pg      MCHC 33.0 g/dL      RDW 14.3 %      RDW-SD 50.1 fl      MPV 9.8 fL      Platelets 283 10*3/mm3      Neutrophil % 68.3 %      Lymphocyte  % 17.0 %      Monocyte % 7.1 %      Eosinophil % 2.7 %      Basophil % 0.8 %      Immature Grans % 4.1 %      Neutrophils, Absolute 5.83 10*3/mm3      Lymphocytes, Absolute 1.45 10*3/mm3      Monocytes, Absolute 0.61 10*3/mm3      Eosinophils, Absolute 0.23 10*3/mm3      Basophils, Absolute 0.07 10*3/mm3      Immature Grans, Absolute 0.35 10*3/mm3      nRBC 0.0 /100 WBC                 Plan:    Juani's leg pain has resolved.  She is having expected back pain.  She does have some new numbness left anterior thigh.  She is neurologically stable.    Will start PT/OT today for safe ambulation  LSO brace when out of bed.  No bending, twisting or lifting greater than 10 pounds  SCD's for DVT prophylaxis  DC Pericolace  Start Senna S 2 tabs BID  If she does well with PT and pain is controlled with oral medication, possible DC home today.        Degenerative disc disease, lumbar    Post-operative pain    Scoliosis        Meshia ELLY Ashford PA-C

## 2024-04-19 DIAGNOSIS — M54.16 LUMBAR RADICULOPATHY: Primary | ICD-10-CM

## 2024-04-19 RX ORDER — OXYCODONE AND ACETAMINOPHEN 10; 325 MG/1; MG/1
1 TABLET ORAL EVERY 6 HOURS PRN
Qty: 24 TABLET | Refills: 0 | Status: SHIPPED | OUTPATIENT
Start: 2024-04-19 | End: 2024-04-25

## 2024-04-24 NOTE — H&P
H&P    Chief Complaint   Patient presents with    Back Pain    Leg Pain           HPI: Patient is a 58-year-old female who underwent a transforaminal lumbar interbody fusion at L3/4 approximately a month ago however she developed rather severe leg pain approximately 2 weeks ago and a new x-ray shows that the graft likely migrated laterally and anterior and is likely causing her right leg pain.  Because of this we did bring her back into the hospital to revise the interbody graft.  We did discuss with her the risks and benefits and she wished to proceed.    Review of Systems     Pertinent positives/negatives documented in HPI.  All other systems reviewed and negative.    Past Medical History:  has a past medical history of Abnormal ECG, Arthritis, Cardiomyopathy, Claustrophobia, DM (diabetes mellitus), GERD (gastroesophageal reflux disease), HTN (hypertension), Hypercholesteremia, Low back pain, Lumbosacral disc disease, Myocardial infarction, Peripheral neuropathy, Rib fractures, and Right bundle branch block.    Past Surgical History:  has a past surgical history that includes Lumbar fusion (2018); Laminectomy (07/2023); Hemorrhoid surgery; lumbar laminectomy with fusion (Bilateral, 03/28/2024); Back surgery; Spinal fusion; and direct lateral fusion with posterior pedicle screw (Bilateral, 4/17/2024).    Family History: family history includes Alcohol abuse in her brother; Anxiety disorder in her daughter; Arthritis in her father; Cancer in her father; Depression in her daughter; Diabetes in her father; Heart disease in her mother; Hyperlipidemia in her mother; Kidney disease in her mother; Miscarriages / Stillbirths in her mother; Stroke in her mother; Vision loss in her mother.    Social History:  reports that she has quit smoking. Her smoking use included cigarettes. She has been exposed to tobacco smoke. She has never used smokeless tobacco. She reports that she does not currently use alcohol. She reports that  "she does not use drugs.    Allergies: Patient has no known allergies.    Medications: Scheduled Meds:  Continuous Infusions:No current facility-administered medications for this encounter.    PRN Meds:.     Objective:  Vital signs: (most recent): Blood pressure 116/50, pulse 88, temperature 97.9 °F (36.6 °C), temperature source Oral, resp. rate 16, height 165.1 cm (65\"), weight 79.8 kg (175 lb 14.4 oz), SpO2 97%.        Neurologic Exam     Mental Status   Oriented to person, place, and time.   Attention: normal. Concentration: normal.   Speech: speech is normal   Level of consciousness: alert  Knowledge: good.   Normal comprehension.     Cranial Nerves   Cranial nerves II through XII intact.     Motor Exam     Strength   Strength 5/5 throughout.     Sensory Exam   Light touch normal.     Gait, Coordination, and Reflexes     Gait  Gait: normal      Vital Signs       Physical Exam  Neurological:      Mental Status: She is oriented to person, place, and time.      Cranial Nerves: Cranial nerves 2-12 are intact.      Motor: Motor strength is normal.     Gait: Gait is intact.   Psychiatric:         Speech: Speech normal.         Results Review:   I reviewed the patient's new clinical results.  I reviewed the patient's new imaging results and agree with the interpretation.  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      Degenerative disc disease, lumbar    Post-operative pain    " Scoliosis      I discussed the patient's findings and my recommendations with patient    Shin DELCID Darrel,   04/24/24  16:03 CDT    I spent 30 minutes caring for Jyoti on this date of service. This time includes time spent by me in the following activities: preparing for the visit, reviewing tests, and obtaining and/or reviewing a separately obtained history

## 2024-05-03 ENCOUNTER — OFFICE VISIT (OUTPATIENT)
Dept: NEUROSURGERY | Facility: CLINIC | Age: 59
End: 2024-05-03
Payer: COMMERCIAL

## 2024-05-03 VITALS — BODY MASS INDEX: 29.12 KG/M2 | WEIGHT: 174.8 LBS | HEIGHT: 65 IN

## 2024-05-03 DIAGNOSIS — Z78.9 NONSMOKER: ICD-10-CM

## 2024-05-03 DIAGNOSIS — M54.16 LUMBAR RADICULOPATHY: Primary | ICD-10-CM

## 2024-05-03 DIAGNOSIS — M51.36 DEGENERATIVE DISC DISEASE, LUMBAR: Primary | ICD-10-CM

## 2024-05-03 DIAGNOSIS — E66.3 OVERWEIGHT WITH BODY MASS INDEX (BMI) 25.0-29.9: ICD-10-CM

## 2024-05-03 DIAGNOSIS — M51.36 DEGENERATIVE DISC DISEASE, LUMBAR: ICD-10-CM

## 2024-05-03 RX ORDER — LACTOBACILLUS RHAMNOSUS GG 10B CELL
1 CAPSULE ORAL
COMMUNITY

## 2024-05-03 RX ORDER — CARVEDILOL 25 MG/1
25 TABLET ORAL
COMMUNITY
Start: 2024-05-01

## 2024-05-03 RX ORDER — TRAMADOL HYDROCHLORIDE 50 MG/1
50 TABLET ORAL EVERY 6 HOURS PRN
Qty: 24 TABLET | Refills: 0 | Status: SHIPPED | OUTPATIENT
Start: 2024-05-03 | End: 2024-05-09

## 2024-05-03 RX ORDER — TRAMADOL HYDROCHLORIDE 50 MG/1
50 TABLET ORAL EVERY 6 HOURS PRN
COMMUNITY

## 2024-05-03 NOTE — PROGRESS NOTES
"    Chief complaint:   Chief Complaint   Patient presents with    Post-op     DIRECT LATERAL FUSION L3/4- pt states doing great.           Subjective     HPI: Juani is 2 weeks status post L3-4 direct lateral fusion with removal of TLIF graft with history of previous L4-5 and L5-S1 fusion.  She is off oxycodone and is utilizing tramadol and Tylenol for pain.  She feels that she is doing pretty well but the left lower extremity does feel weak and she has some numbness and at times burning pain in the left anterior thigh.  She denies any postop fevers and wound drainage.  She has been increasing her walking.  She is still utilizing a rollator.  She has no new radicular complaints.  She is still utilizing high-dose gabapentin, 900 mg 3 times a day as well as nortriptyline 40 mg at bedtime.  She would like to start weaning off medication if possible.    Review of Systems      Objective      Vital Signs  Ht 165.1 cm (65\")   Wt 79.3 kg (174 lb 12.8 oz)   BMI 29.09 kg/m²     Physical Exam  Constitutional:       Appearance: She is well-developed.   HENT:      Head: Normocephalic.   Eyes:      Pupils: Pupils are equal, round, and reactive to light.   Pulmonary:      Effort: Pulmonary effort is normal.   Musculoskeletal:         General: Normal range of motion.      Cervical back: Normal range of motion.   Skin:     General: Skin is warm and dry.      Comments: Left lateral lumbar incision is healed.  No drainage or signs of infection.   Neurological:      Mental Status: She is alert and oriented to person, place, and time.      GCS: GCS eye subscore is 4. GCS verbal subscore is 5. GCS motor subscore is 6.      Cranial Nerves: No cranial nerve deficit.      Sensory: Sensory deficit present.      Motor: Weakness present.      Gait: Gait normal.      Deep Tendon Reflexes: Reflexes are normal and symmetric.   Psychiatric:         Speech: Speech normal.         Behavior: Behavior normal.         Thought Content: Thought " content normal.         Neurologic Exam     Mental Status   Oriented to person, place, and time.   Speech: speech is normal   Level of consciousness: alert  Knowledge: good.     Cranial Nerves     CN III, IV, VI   Pupils are equal, round, and reactive to light.    Motor Exam   Muscle bulk: normal  Overall muscle tone: normal    Strength   Strength 5/5 except as noted. Left iliopsoas weakness grade 4/5     Sensory Exam   Sensory deficit distribution on left: L3    Gait, Coordination, and Reflexes Gait is steady with rollator       Imaging review: None        Assessment/Plan: Juani will continue her brace when standing or walking as well as 10 pound lifting restriction and avoidance of bending and twisting.  I will asked Dr. Rojo to send her a refill of tramadol.  We discussed weaning down on nortriptyline by 10 mg weekly then discontinue.  At the next visit, we will discuss weaning down on the gabapentin.  I did encourage walking.    She will follow-up with Dr. Rojo in about 6 weeks with AP, lateral and flexion-extension views of the lumbar spine prior to that visit.    She will call in the meantime if she has any issues or concerns.      Patient is a nonsmoker    The patient's Body mass index is 29.09 kg/m².. BMI is above normal parameters. Recommendations include: educational material    Diagnoses and all orders for this visit:    1. Lumbar radiculopathy (Primary)  -     XR Spine Lumbar AP & Lateral With Flex & Ext; Future    2. Degenerative disc disease, lumbar  -     XR Spine Lumbar AP & Lateral With Flex & Ext; Future    3. Nonsmoker    4. Overweight with body mass index (BMI) 25.0-29.9        I discussed the patients findings and my recommendations with patient  Sterling Ashford PA-C  05/03/24  12:58 CDT          Answers submitted by the patient for this visit:  Other (Submitted on 5/3/2024)  Please describe your symptoms.: Post-op follow up.  Have you had these symptoms before?: Yes  How long have you been  having these symptoms?: Greater than 2 weeks  Please list any medications you are currently taking for this condition.: Tramadol, Tylenol, Gabapentin, Nortriptyline.  Please describe any probable cause for these symptoms. : Surgery, neuropathy in legs.  Primary Reason for Visit (Submitted on 5/3/2024)  What is the primary reason for your visit?: Other

## 2024-05-03 NOTE — PATIENT INSTRUCTIONS
No lifting > 10 pounds for total 6 months  Brace should be worn when standing and walking for 3 months  Avoid bending and and twisting  No tub baths or swimming until incisionsare completely healed.  Walking is encouraged.  You can drive if you have not had pain medication within previous 4 hours.  Call the office for any worsening of pain, calf pain and/or swelling or new weakness or other concerns.

## 2024-05-24 ENCOUNTER — TELEPHONE (OUTPATIENT)
Dept: NEUROSURGERY | Facility: CLINIC | Age: 59
End: 2024-05-24
Payer: COMMERCIAL

## 2024-06-03 ENCOUNTER — HOSPITAL ENCOUNTER (OUTPATIENT)
Dept: GENERAL RADIOLOGY | Facility: HOSPITAL | Age: 59
Discharge: HOME | End: 2024-06-03
Admitting: PHYSICIAN ASSISTANT
Payer: COMMERCIAL

## 2024-06-03 ENCOUNTER — OFFICE VISIT (OUTPATIENT)
Dept: NEUROSURGERY | Facility: CLINIC | Age: 59
End: 2024-06-03
Payer: COMMERCIAL

## 2024-06-03 VITALS — HEIGHT: 65 IN | WEIGHT: 174 LBS | BODY MASS INDEX: 28.99 KG/M2

## 2024-06-03 DIAGNOSIS — M51.36 DEGENERATIVE DISC DISEASE, LUMBAR: ICD-10-CM

## 2024-06-03 DIAGNOSIS — M51.36 DEGENERATIVE DISC DISEASE, LUMBAR: Primary | ICD-10-CM

## 2024-06-03 DIAGNOSIS — M54.16 LUMBAR RADICULOPATHY: ICD-10-CM

## 2024-06-03 PROCEDURE — 72110 X-RAY EXAM L-2 SPINE 4/>VWS: CPT

## 2024-06-03 PROCEDURE — 99024 POSTOP FOLLOW-UP VISIT: CPT | Performed by: NEUROLOGICAL SURGERY

## 2024-06-03 RX ORDER — CARVEDILOL 25 MG/1
25 TABLET ORAL 2 TIMES DAILY WITH MEALS
COMMUNITY

## 2024-06-03 NOTE — PROGRESS NOTES
"    Chief complaint:   Chief Complaint   Patient presents with    Post-op     7 week post op visit        Subjective     HPI: I had a chance to see Jyoti today in follow-up.  She is doing very well and her leg pain has resolved.  She does have some numbness in the leg however this is also improving slowly.    Review of Systems      Objective      Vital Signs  Ht 165.1 cm (65\")   Wt 78.9 kg (174 lb)   BMI 28.96 kg/m²     Physical Exam    Neurologic Exam     Sensory Exam   Sensory deficit distribution on left: L3      Imaging review:   EXAM: XR SPINE LUMBAR AP AND LAT W FLEX AND EXT-      DATE: 6/3/2024 1:36 PM     HISTORY: lumbar radiculopathy; M54.16-Radiculopathy, lumbar region;  M51.36-Other intervertebral disc degeneration, lumbar region       COMPARISON: 4/12/2024.     TECHNIQUE: Frontal and lateral views were obtained. 4.0 images.       FINDINGS:    There is dextroscoliosis. Patient is status post posterior and interbody  fusion at L3-L4 and L5-S1. There is left lateral fusion at L3-L4 and  interbody fusion at L4-L5. No hardware complication is seen. There is  disc base narrowing and spondylosis at L2-L3. There is no  spondylolisthesis. No acquired spondylolisthesis is seen on the flexion  and extension views.        IMPRESSION:     1. Fusion hardware as above with no hardware complication identified.  Displaced interbody fusion device at L3-L4 has been revised.  2. Spondylosis at L2-L3.     This report was signed and finalized on 6/3/2024 3:03 PM by Carlos Manuel Ruiz.           Assessment/Plan:   Status post direct lateral interbody fusion    Jyoti is doing very well at this time and her leg pain has resolved.  I would like to see her back in another 8 weeks to check on her progress.  I look forward to seeing her at her next visit.    Patient is a nonsmoker  The patient's Body mass index is 28.96 kg/m².. BMI is above normal parameters. Recommendations include: continue with current weight loss " program    There are no diagnoses linked to this encounter.    I discussed the patients findings and my recommendations with patient  Shin Rojo DO  06/03/24  15:38 CDT

## 2024-08-19 ENCOUNTER — OFFICE VISIT (OUTPATIENT)
Dept: NEUROSURGERY | Facility: CLINIC | Age: 59
End: 2024-08-19
Payer: COMMERCIAL

## 2024-08-19 VITALS — HEIGHT: 65 IN | WEIGHT: 173.2 LBS | BODY MASS INDEX: 28.86 KG/M2

## 2024-08-19 DIAGNOSIS — E66.3 OVERWEIGHT WITH BODY MASS INDEX (BMI) OF 28 TO 28.9 IN ADULT: ICD-10-CM

## 2024-08-19 DIAGNOSIS — M51.36 DEGENERATIVE DISC DISEASE, LUMBAR: ICD-10-CM

## 2024-08-19 DIAGNOSIS — Z78.9 NONSMOKER: ICD-10-CM

## 2024-08-19 DIAGNOSIS — M46.1 SACROILIITIS: Primary | ICD-10-CM

## 2024-08-19 DIAGNOSIS — M54.16 LUMBAR RADICULOPATHY: ICD-10-CM

## 2024-08-19 PROCEDURE — 99214 OFFICE O/P EST MOD 30 MIN: CPT | Performed by: PHYSICIAN ASSISTANT

## 2024-08-19 RX ORDER — LOSARTAN POTASSIUM 25 MG/1
25 TABLET ORAL DAILY
COMMUNITY

## 2024-08-19 RX ORDER — PRAVASTATIN SODIUM 10 MG
10 TABLET ORAL
COMMUNITY
Start: 2024-07-19

## 2024-08-19 NOTE — PATIENT INSTRUCTIONS
Continue 10# lifting restriction and continue to avoid bending/twisting  Can start weaning out of brace.  Follow up in 8 weeks, Dr Rojo with CT lumbar spine prior to that visit.

## 2024-08-20 DIAGNOSIS — M46.1 SACROILIITIS: ICD-10-CM

## 2024-08-20 DIAGNOSIS — M51.36 DEGENERATIVE DISC DISEASE, LUMBAR: Primary | ICD-10-CM

## 2024-08-20 DIAGNOSIS — M54.16 LUMBAR RADICULOPATHY: ICD-10-CM

## 2024-10-16 ENCOUNTER — OFFICE VISIT (OUTPATIENT)
Dept: NEUROSURGERY | Facility: CLINIC | Age: 59
End: 2024-10-16
Payer: COMMERCIAL

## 2024-10-16 ENCOUNTER — HOSPITAL ENCOUNTER (OUTPATIENT)
Dept: CT IMAGING | Facility: HOSPITAL | Age: 59
Discharge: HOME | End: 2024-10-16
Admitting: PHYSICIAN ASSISTANT
Payer: COMMERCIAL

## 2024-10-16 VITALS — BODY MASS INDEX: 27.92 KG/M2 | WEIGHT: 167.6 LBS | HEIGHT: 65 IN

## 2024-10-16 DIAGNOSIS — Z78.9 NONSMOKER: ICD-10-CM

## 2024-10-16 DIAGNOSIS — M51.369 DEGENERATIVE DISC DISEASE, LUMBAR: ICD-10-CM

## 2024-10-16 DIAGNOSIS — M46.1 SACROILIITIS: ICD-10-CM

## 2024-10-16 DIAGNOSIS — M54.16 LUMBAR RADICULOPATHY: ICD-10-CM

## 2024-10-16 DIAGNOSIS — M51.360 DEGENERATION OF INTERVERTEBRAL DISC OF LUMBAR REGION WITH DISCOGENIC BACK PAIN: Primary | ICD-10-CM

## 2024-10-16 DIAGNOSIS — E66.3 OVERWEIGHT WITH BODY MASS INDEX (BMI) OF 27 TO 27.9 IN ADULT: ICD-10-CM

## 2024-10-16 PROCEDURE — 72131 CT LUMBAR SPINE W/O DYE: CPT

## 2024-10-16 RX ORDER — VITAMIN E 200 UNIT
CAPSULE ORAL
COMMUNITY

## 2024-10-16 RX ORDER — LOSARTAN POTASSIUM 50 MG/1
75 TABLET ORAL DAILY
COMMUNITY
Start: 2024-09-09

## 2024-10-16 NOTE — PROGRESS NOTES
"    Chief complaint:   Chief Complaint   Patient presents with    Follow-up     Pt reports to office for follow up after CT- surgery DIRECT LATERAL FUSION L3/4 4/17/2024- pt states she is doing okay. Having some pain but not bad        Subjective     HPI: Juani is 6 months S/P revision DLIF L3/4.  She is having some right sided SI joint pain but otherwise is doing well.  She had bilateral SI joint injections which really did not help much but she is currently in SI joint physical therapy which she finds very helpful.  She is also in pelvic floor therapy for urinary incontinence.  She still has some numbness in the left thigh but has no radiating pain to the lower extremities.  She is ambulatory without any assistive devices.  She has decreased her gabapentin to 300 mg 3 times a day and is also taking Tylenol as needed.    Review of Systems      Objective      Vital Signs  Ht 165.1 cm (65\")   Wt 76 kg (167 lb 9.6 oz)   BMI 27.89 kg/m²     Physical Exam  Constitutional:       Appearance: Normal appearance. She is well-developed.   HENT:      Head: Normocephalic.   Eyes:      Pupils: Pupils are equal, round, and reactive to light.   Cardiovascular:      Rate and Rhythm: Normal rate.   Pulmonary:      Effort: Pulmonary effort is normal.   Musculoskeletal:         General: Normal range of motion.      Cervical back: Normal range of motion.   Skin:     General: Skin is warm and dry.   Neurological:      Mental Status: She is oriented to person, place, and time.      GCS: GCS eye subscore is 4. GCS verbal subscore is 5. GCS motor subscore is 6.      Cranial Nerves: No cranial nerve deficit.      Sensory: Sensory deficit present.      Motor: Motor strength is normal.No weakness.      Gait: Gait normal.      Deep Tendon Reflexes: Reflexes are normal and symmetric.   Psychiatric:         Speech: Speech normal.         Behavior: Behavior normal.         Thought Content: Thought content normal.         Neurological " Exam  Mental Status  Awake, alert and oriented to person, place and time. Oriented to person, place, and time. Speech is normal.    Cranial Nerves  CN III, IV, VI: Pupils equal round and reactive to light bilaterally.    Motor  Normal muscle bulk throughout. Strength is 5/5 throughout all four extremities.    Sensory  Decreased sensation to light touch left lateral thigh.    Reflexes  Deep tendon reflexes are 2+ and symmetric in all four extremities.    Gait   Normal gait.Casual gait is normal including stance, stride, and arm swing.       Imaging review: CT of the lumbar spine was reviewed and demonstrates stable fusion changes L3-4 with some bony bridging through the graft. Solid bony fusion column L4-S1 from previous surgery.  There is severe disc degeneration L2-L3 with vacuum disc.  No acute findings.    CT Lumbar Spine Without Contrast    Result Date: 10/16/2024  1. Previous hardware complication, as described above, with interval L3-L4 level lateral interbody fusion. No new hardware complication. The new L3-L4 level hardware is unremarkable. The pre-existing posterior fusion roxann fractures are again seen. 2. No acute fracture identified. Vertebral body heights are well-maintained. 3. Loss of disc height above the existing fusion at L2-L3. No spondylolisthesis at this level.   This report was signed and finalized on 10/16/2024 12:49 PM by Dr Jack Dunn.        Note: bilateral rods were cut purposely with previous surgery as substantiated in Op Note. Rods are not fractured.    Assessment/Plan: I reviewed images in detail with Juani she has some bony bridging is through the graft at L3-4..  Clinically she is doing very well.  Her SI joint issues are responding to physical therapy and she is neurologically stable.    She can increase her lifting to 20 pounds and start easing back into some of her normal activities.  We did discussed avoiding aggravating activities and utilizing protective body  mechanics.    She will follow-up here with Dr. Rojo in about 3 months.  She will call for sooner appointment if she has any issues or concerns in the interim.      Patient is a nonsmoker    The patient's Body mass index is 27.89 kg/m².. BMI is above normal parameters. Recommendations include: educational material    Diagnoses and all orders for this visit:    1. Degeneration of intervertebral disc of lumbar region with discogenic back pain (Primary)    2. Sacroiliitis    3. Nonsmoker    4. Overweight with body mass index (BMI) of 27 to 27.9 in adult    I spent 30 minutes caring for Jyoti on this date of service. This time includes time spent by me in the following activities: preparing for the visit, reviewing tests, obtaining and/or reviewing a separately obtained history, performing a medically appropriate examination and/or evaluation, counseling and educating the patient/family/caregiver, referring and communicating with other health care professionals, documenting information in the medical record, independently interpreting results and communicating that information with the patient/family/caregiver, and care coordination.      I discussed the patients findings and my recommendations with patient  Sterling Ashford PA-C  10/16/24  13:23 CDT

## 2024-10-16 NOTE — PATIENT INSTRUCTIONS
Can increase lifting to 20#  Can ease into some bending. Avoid aggravating activities  Follow up Dr Rojo in 3 months  Call for sooner appointment if any issues before next appointment

## 2025-01-29 ENCOUNTER — OFFICE VISIT (OUTPATIENT)
Dept: NEUROSURGERY | Facility: CLINIC | Age: 60
End: 2025-01-29
Payer: COMMERCIAL

## 2025-01-29 VITALS — BODY MASS INDEX: 27.82 KG/M2 | WEIGHT: 167 LBS | HEIGHT: 65 IN

## 2025-01-29 DIAGNOSIS — E66.3 OVERWEIGHT (BMI 25.0-29.9): Primary | ICD-10-CM

## 2025-01-29 PROCEDURE — 99213 OFFICE O/P EST LOW 20 MIN: CPT | Performed by: NEUROLOGICAL SURGERY

## 2025-01-29 NOTE — PROGRESS NOTES
"L3/4 direct lateral interbody fusion looks fused, severe degeneration L2/3, getting some relief from SI joint injections but will set up some diagnostic SI joint injections with Dr. Gonzales.  Follow-up 3 months    Chief complaint:   Chief Complaint   Patient presents with    Follow-up     Follow up right SI Joint pain        Subjective     HPI: I had a chance to see Jyoti today in follow-up.  Her L3/4 fusion appears to be fused but she does have severe degeneration at L2/3.  She is getting some relief from the SI joint injections and we will try to get some diagnostic injections to better confirm that the SI joint is the culprit before considering any surgical intervention.    Review of Systems      Objective      Vital Signs  Ht 165.1 cm (65\")   Wt 75.8 kg (167 lb)   BMI 27.79 kg/m²     Physical Exam  Eyes:      General: Lids are normal.      Extraocular Movements: Extraocular movements intact.   Neurological:      Motor: Motor strength is normal.  Psychiatric:         Speech: Speech normal.         Neurological Exam  Mental Status  Awake, alert and oriented to person, place and time. Oriented to person, place and time. Speech is normal. Language is fluent with no aphasia. Attention and concentration are normal. Fund of knowledge is appropriate for level of education.    Cranial Nerves  CN III, IV, VI: Extraocular movements intact bilaterally. Normal lids and orbits bilaterally.    Motor   No abnormal involuntary movements. Strength is 5/5 throughout all four extremities.    Gait  Casual gait is normal including stance, stride, and arm swing.       Imaging review:   CT LUMBAR SPINE WO CONTRAST- 10/16/2024 10:40 AM     HISTORY: DDD, S/P L3/4 DLIF; M51.369-Other intervertebral disc  degeneration, lumbar region without mention of lumbar back pain or lower  extremity pain; M54.16-Radiculopathy, lumbar region     COMPARISON: Lumbar spine CT dated 4/17/2024     DOSE LENGTH PRODUCT: 557.47 mGy.cm. Automated exposure " control was also  utilized to decrease patient radiation dose.     TECHNIQUE: Serial helical tomographic images of the lumbar spine were  obtained without the use of intravenous contrast. Additionally, sagittal  and coronal reformatted images were provided for review. Automated  exposure control is utilized to reduce patient radiation dose.     FINDINGS:     Counting will assume 5 lumbar-type vertebrae. The spine is imaged from  L1 to S3.     Prior CT scan demonstrated hardware complication with posterior fusion  roxann fractures, spondylolisthesis and L3-L4 level interbody spacer  displacement. Interval L3-L4 level lateral interbody fusion at this  level. No new hardware complication. The previously identified posterior  fusion roxann fractures are again identified. There is no loosening of the  new L3-L4 level spacer. No acute fracture identified. Vertebral body  heights are well-maintained. Previous L4 and L5 level laminectomies.  Neuroforaminal narrowing most notably at L5-S1 on the right. Included  portions of the osseous pelvis are intact. Paraspinal soft tissues are  unremarkable.     IMPRESSION:  1. Previous hardware complication, as described above, with interval  L3-L4 level lateral interbody fusion. No new hardware complication. The  new L3-L4 level hardware is unremarkable. The pre-existing posterior  fusion orxann fractures are again seen.  2. No acute fracture identified. Vertebral body heights are  well-maintained.  3. Loss of disc height above the existing fusion at L2-L3. No  spondylolisthesis at this level.           Assessment/Plan:   Sacroiliitis  Degenerative disc disease    Given the fact that Jyoti did well with her SI joint injections I would like to get a diagnostic SI joint injection to see if this is the main source of her pain.  We will get her set up for the diagnostic SI joint injections and she will follow-up with me once they are complete.  I look forward to seeing her at her next  visit.    Patient is a nonsmoker  The patient's Body mass index is 27.79 kg/m².. BMI is above normal parameters. Recommendations include: continue with current weight loss program    Diagnoses and all orders for this visit:    1. Overweight (BMI 25.0-29.9) (Primary)        I discussed the patients findings and my recommendations with patient  Shin Rojo DO  01/29/25  12:37 CST

## 2025-02-13 ENCOUNTER — TELEPHONE (OUTPATIENT)
Dept: NEUROSURGERY | Facility: CLINIC | Age: 60
End: 2025-02-13
Payer: COMMERCIAL

## 2025-02-13 NOTE — TELEPHONE ENCOUNTER
I faxed referral to Dr. Gonzales for bilateral Diagnostic SI Joint Injections patient already established with Dr. Gonzales

## 2025-03-04 ENCOUNTER — TELEPHONE (OUTPATIENT)
Dept: NEUROSURGERY | Facility: CLINIC | Age: 60
End: 2025-03-04
Payer: COMMERCIAL

## 2025-03-04 NOTE — TELEPHONE ENCOUNTER
FAXED REFERRAL FOR SI JOINT INJECTIONS TO DR. KUMARI X 2 PATIENT ALREADY ESTABLISHED PATIENT WITH DR. KUMARI

## 2025-03-26 ENCOUNTER — TELEPHONE (OUTPATIENT)
Dept: NEUROSURGERY | Facility: CLINIC | Age: 60
End: 2025-03-26
Payer: COMMERCIAL

## 2025-03-26 NOTE — TELEPHONE ENCOUNTER
called patient in regards to messagea bout switching pain management. patient stated she has a follow up with Dr Gonzales next week and will just stick with him

## 2025-03-26 NOTE — TELEPHONE ENCOUNTER
lvm with patient informing Meshia will be out of town we will need to move appt to April 22nd at 11:30     It is okay for the hub to relay the message to the patient and schedule if possible

## 2025-04-22 ENCOUNTER — OFFICE VISIT (OUTPATIENT)
Dept: NEUROSURGERY | Facility: CLINIC | Age: 60
End: 2025-04-22
Payer: COMMERCIAL

## 2025-04-22 VITALS — WEIGHT: 164.6 LBS | HEIGHT: 65 IN | BODY MASS INDEX: 27.42 KG/M2

## 2025-04-22 DIAGNOSIS — M46.1 SACROILIITIS: ICD-10-CM

## 2025-04-22 DIAGNOSIS — Z78.9 NONSMOKER: ICD-10-CM

## 2025-04-22 DIAGNOSIS — M51.362 DEGENERATION OF INTERVERTEBRAL DISC OF LUMBAR REGION WITH DISCOGENIC BACK PAIN AND LOWER EXTREMITY PAIN: Primary | ICD-10-CM

## 2025-04-22 DIAGNOSIS — E66.3 OVERWEIGHT WITH BODY MASS INDEX (BMI) OF 27 TO 27.9 IN ADULT: ICD-10-CM

## 2025-04-22 PROCEDURE — 99214 OFFICE O/P EST MOD 30 MIN: CPT | Performed by: PHYSICIAN ASSISTANT

## 2025-04-22 RX ORDER — ERGOCALCIFEROL 1.25 MG/1
50000 CAPSULE, LIQUID FILLED ORAL
COMMUNITY
Start: 2025-03-29

## 2025-04-22 RX ORDER — DIPHENOXYLATE HYDROCHLORIDE AND ATROPINE SULFATE 2.5; .025 MG/1; MG/1
TABLET ORAL
COMMUNITY

## 2025-04-22 RX ORDER — MAGNESIUM GLYCINATE 100 MG
CAPSULE ORAL
COMMUNITY

## 2025-04-22 RX ORDER — GABAPENTIN 300 MG/1
CAPSULE ORAL
COMMUNITY

## 2025-04-22 RX ORDER — ZOLEDRONIC ACID 0.04 MG/ML
4 INJECTION, SOLUTION INTRAVENOUS
COMMUNITY

## 2025-04-22 RX ORDER — UBIDECARENONE 100 MG
CAPSULE ORAL
COMMUNITY

## 2025-04-22 NOTE — PROGRESS NOTES
"    Chief complaint:   Chief Complaint   Patient presents with    Follow-up     Pt reports to office for follow up after si joint diagnostics injections        Subjective     HPI: Juani comes in for follow up after diagnostic SI joint injections, completed last week, Dr Gonzales.  She reports 50 to 60% symptom reduction after injections.  She continues with pain in the SI joint region that worsens with standing and walking.  She also develops radiating pain bilateral anterior thighs.  She states that she just wishes she was able to walk even one quarter of a mile.  She states that she can do half mile on the treadmill because she has the rails to hold onto.  She has completed physical therapy and is exercising 3 times a week with a  without as much symptom improvement as she would like.  She continues on gabapentin and Tylenol for pain.    She is 1 year status post L3-L4 DLIF and previous L4-5 and L5-S1 TLIF.    Review of Systems      Objective      Vital Signs  Ht 165.1 cm (65\")   Wt 74.7 kg (164 lb 9.6 oz)   BMI 27.39 kg/m²     Physical Exam  Constitutional:       Appearance: Normal appearance. She is well-developed.   HENT:      Head: Normocephalic.   Eyes:      Pupils: Pupils are equal, round, and reactive to light.   Cardiovascular:      Rate and Rhythm: Normal rate.   Pulmonary:      Effort: Pulmonary effort is normal.   Musculoskeletal:         General: Tenderness (Bilateral SI joints) present. Normal range of motion.      Cervical back: Normal range of motion.   Skin:     General: Skin is warm and dry.   Neurological:      Mental Status: She is oriented to person, place, and time.      GCS: GCS eye subscore is 4. GCS verbal subscore is 5. GCS motor subscore is 6.      Cranial Nerves: No cranial nerve deficit.      Sensory: No sensory deficit.      Motor: Motor strength is normal.No weakness.      Deep Tendon Reflexes: Reflexes are normal and symmetric.   Psychiatric:         Speech: Speech " normal.         Behavior: Behavior normal.         Thought Content: Thought content normal.         Neurological Exam  Mental Status  Awake, alert and oriented to person, place and time. Oriented to person, place, and time. Speech is normal.    Cranial Nerves  CN III, IV, VI: Pupils equal round and reactive to light bilaterally.    Motor  Normal muscle bulk throughout. Strength is 5/5 throughout all four extremities.    Sensory  Sensation is intact to light touch, pinprick, vibration and proprioception in all four extremities.    Reflexes  Deep tendon reflexes are 2+ and symmetric in all four extremities.    Gait    Gait is slow but steady.       Imaging review: CT of the lumbar spine dated 10/16/24 was reviewed and demonstrates stable fusion changes L3-S1.  There is severe disc degeneration L2-L3 with vacuum disc phenomenon.        Assessment/Plan: Juani continues with significant pain bilateral buttocks with radiation bilateral anterior thighs.  Her pain is most pronounced with standing and walking and she feels significantly limited from an activity standpoint despite extensive outpatient physical therapy and exercise program.  She is neurologically stable.  Diagnostic SI joint injections provided 50 to 60% symptom improvement for duration of anesthetic.    To better assess her issues, I would like to proceed with new MRI of the lumbar spine to see if there is any significant stenosis at L2-L3.    She will follow-up with Dr. Rojo after MRIs completed for review.  If she develops any worsening pain, focal weakness or other issues or concerns before that appointment, she will call for sooner visit.      Patient is a nonsmoker    The patient's Body mass index is 27.39 kg/m².. BMI is above normal parameters. Recommendations include: educational material smells very strong    Diagnoses and all orders for this visit:    1. Degeneration of intervertebral disc of lumbar region with discogenic back pain and lower  extremity pain (Primary)  -     MRI Lumbar Spine Without Contrast; Future    2. Sacroiliitis    3. Nonsmoker    4. Overweight with body mass index (BMI) of 27 to 27.9 in adult    I spent 32 minutes caring for Jyoti on this date of service. This time includes time spent by me in the following activities: preparing for the visit, reviewing tests, obtaining and/or reviewing a separately obtained history, performing a medically appropriate examination and/or evaluation, counseling and educating the patient/family/caregiver, ordering medications, tests, or procedures, referring and communicating with other health care professionals, documenting information in the medical record, independently interpreting results and communicating that information with the patient/family/caregiver, and care coordination.      I discussed the patients findings and my recommendations with patient  Sterling Ashford PA-C  04/22/25  12:54 CDT

## 2025-04-22 NOTE — PATIENT INSTRUCTIONS
Continue medications as current   Continue exercises as current  Follow up with Dr Rojo after MRI  Continue pain management as current

## 2025-05-10 ENCOUNTER — HOSPITAL ENCOUNTER (OUTPATIENT)
Dept: MRI IMAGING | Facility: HOSPITAL | Age: 60
Discharge: HOME | End: 2025-05-10
Payer: COMMERCIAL

## 2025-05-10 DIAGNOSIS — M51.362 DEGENERATION OF INTERVERTEBRAL DISC OF LUMBAR REGION WITH DISCOGENIC BACK PAIN AND LOWER EXTREMITY PAIN: ICD-10-CM

## 2025-05-10 PROCEDURE — 72148 MRI LUMBAR SPINE W/O DYE: CPT

## 2025-07-02 ENCOUNTER — OFFICE VISIT (OUTPATIENT)
Dept: NEUROSURGERY | Facility: CLINIC | Age: 60
End: 2025-07-02
Payer: COMMERCIAL

## 2025-07-02 VITALS — WEIGHT: 159 LBS | HEIGHT: 65 IN | BODY MASS INDEX: 26.49 KG/M2

## 2025-07-02 DIAGNOSIS — M46.1 SACROILIITIS: ICD-10-CM

## 2025-07-02 DIAGNOSIS — M51.362 DEGENERATION OF INTERVERTEBRAL DISC OF LUMBAR REGION WITH DISCOGENIC BACK PAIN AND LOWER EXTREMITY PAIN: Primary | ICD-10-CM

## 2025-07-02 DIAGNOSIS — M54.16 LUMBAR RADICULOPATHY: ICD-10-CM

## 2025-07-02 DIAGNOSIS — E66.3 OVERWEIGHT (BMI 25.0-29.9): ICD-10-CM

## 2025-07-02 RX ORDER — FAMOTIDINE 20 MG/1
20 TABLET, FILM COATED ORAL 2 TIMES DAILY
COMMUNITY

## 2025-07-02 NOTE — PROGRESS NOTES
"    Chief complaint:   Chief Complaint   Patient presents with    Follow-up     Follow up Sacroiliitis discuss MRI        Subjective     HPI: I had a chance to see Juani today in follow-up and to review her new MRI of the lumbar spine.  She does have rather severe degeneration at L2/3 with moderate stenosis at this level.  Fortunately she has been doing some yoga and is feeling somewhat better and therefore I think we can continue with conservative management for now.    Review of Systems      Objective      Vital Signs  Ht 165.1 cm (65\")   Wt 72.1 kg (159 lb)   BMI 26.46 kg/m²     Physical Exam  Eyes:      General: Lids are normal.      Extraocular Movements: Extraocular movements intact.   Neurological:      Motor: Motor strength is normal.  Psychiatric:         Speech: Speech normal.         Neurological Exam  Mental Status  Awake, alert and oriented to person, place and time. Oriented to person, place and time. Speech is normal. Language is fluent with no aphasia. Attention and concentration are normal. Fund of knowledge is appropriate for level of education.    Cranial Nerves  CN III, IV, VI: Extraocular movements intact bilaterally. Normal lids and orbits bilaterally.    Motor   No abnormal involuntary movements. Strength is 5/5 throughout all four extremities.    Sensory  Left thigh paresthesias.    Gait  Casual gait is normal including stance, stride, and arm swing.       Imaging review:   EXAM: MRI LUMBAR SPINE WO CONTRAST- - 5/10/2025 1:05 PM     HISTORY: lumbar pain; M51.362-Other intervertebral disc degeneration,  lumbar region with discogenic back pain and lower extremity pain       COMPARISON: CT lumbar spine 10/16/2024.     TECHNIQUE: Routine MR of the lumbar spine was performed without  intravenous contrast.     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 The spinal cord ends at  the L2 level. There is normal signal in the conus medullaris.  Degenerative endplate signal changes " are noted at L2-L3. No acute  fracture or marrow edema is identified. The patient is status post  posterior and interbody fusion L3-S1.        L5-S1: There is posterior and interbody osseous fusion and laminectomy  with no central canal stenosis or definite neuroforaminal narrowing.  There is metallic susceptibility artifact related to the hardware.     L4-L5: There is posterior and interbody osseous fusion and laminectomy.  Large left paracentral epidural calcification is noted which is chronic  and unchanged from 2024 extends inferiorly from the L3-L4 disc space  level this causes encroachment on the superior left L4-L5 subarticular  recess.     L3-L4: Again there is a large calcified epidural lesion may be related  to a chronic calcified disc fragment in the left subarticular recess  which extends inferiorly this causes moderate central canal stenosis but  this is unchanged from 2024.     L2-L3: There is junctional spondylosis with disc base narrowing disc  desiccation and spurring of the endplates. There is a broad-based disc  bulge which causes moderate to severe central canal stenosis and  encroachment on both subarticular recesses. There is mild bilateral  neuroforaminal narrowing and no spondylolisthesis.     L1-L2: There is mild broad-based disc bulging but this does not cause  significant central canal or neuroforaminal stenosis send there is no  spondylolisthesis.     There are partially imaged T2 hyperintense left renal cysts.        IMPRESSION:  1. Postoperative changes of posterior and interbody fusion L3-S1 with  large calcified epidural mass at the L3 and L4 levels may represent a  chronic calcified disc fragment which is unchanged from 2024.  2. Junctional spondylosis at L2-L3 with broad-based disc bulge combines  with congenitally short pedicles to produce moderate to severe central  canal stenosis and encroachment on both subarticular recesses.        Assessment/Plan:     Severe disc degeneration  L2/3 with moderate stenosis  I do think that we can continue with conservative management for now however I would like to see Juani morris in 3 months to check on her progress.  I look forward to seeing her at her next visit.    Patient is a nonsmoker  The patient's Body mass index is 26.46 kg/m².. BMI is above normal parameters. Recommendations include: continue with current weight loss program    Diagnoses and all orders for this visit:    1. Degeneration of intervertebral disc of lumbar region with discogenic back pain and lower extremity pain (Primary)    2. Sacroiliitis    3. Lumbar radiculopathy    4. Overweight (BMI 25.0-29.9)        I discussed the patients findings and my recommendations with patient  Shin Rojo DO  07/02/25  09:41 CDT

## (undated) DEVICE — DRP C/ARMOR

## (undated) DEVICE — CVR UNIV C/ARM

## (undated) DEVICE — SUT VIC 0 MO4 CR8 18IN VCP701D

## (undated) DEVICE — GLV SURG DERMASSURE GRN LF PF 8.5

## (undated) DEVICE — KNIF BAYO METRX DISECT

## (undated) DEVICE — SPONGE,DISSECTOR,ROUND CHERRY,XR,ST,5/PK: Brand: MEDLINE

## (undated) DEVICE — 4-PORT MANIFOLD: Brand: NEPTUNE 2

## (undated) DEVICE — DISSCT SECTO 1PC 1P/U 5MMX35CM STRL

## (undated) DEVICE — DILATOR SET 945NSD2750 NIM STIMULATED

## (undated) DEVICE — SUT VIC 2/0 SH CR8 27IN VCP785D

## (undated) DEVICE — TAPE,CLOTH/SILK,CURAD,3"X10YD,LF,40/CS: Brand: CURAD

## (undated) DEVICE — GLV SURG SENSICARE W/ALOE PF LF 8.5 STRL

## (undated) DEVICE — ELECTRD BLD EZ CLN MOD XLNG 2.75IN

## (undated) DEVICE — APPL DURAPREP IODOPHOR APL 26ML

## (undated) DEVICE — PLATE 2140001 OLIF25 2-HOLE PLATE SMALL
Type: IMPLANTABLE DEVICE | Site: SPINE LUMBAR | Status: NON-FUNCTIONAL
Brand: PIVOX™ OBLIQUE LATERAL SPINAL SYSTEM
Removed: 2024-04-17

## (undated) DEVICE — GLV SURG BIOGEL LTX PF 6 1/2

## (undated) DEVICE — SUT MNCRYL 4/0 PS2 27IN UD MCP426H

## (undated) DEVICE — TRAP FLD MINIVAC MEGADYNE 100ML

## (undated) DEVICE — PROXIMATE RH ROTATING HEAD SKIN STAPLERS (35 REGULAR) CONTAINS 35 STAINLESS STEEL STAPLES: Brand: PROXIMATE

## (undated) DEVICE — UTILITY MARKER W/MED LABELS: Brand: MEDLINE

## (undated) DEVICE — CATH IV ANGIO FEP 12G 3IN LTBLU 10PK

## (undated) DEVICE — ELECTRD BLD EZ CLN MOD 4IN

## (undated) DEVICE — SHEET, T, LAPAROTOMY, STERILE: Brand: MEDLINE

## (undated) DEVICE — NEEDLE, QUINCKE, 18GX3.5": Brand: MEDLINE

## (undated) DEVICE — SPK10277 JACKSON/PRO-AXIS KIT: Brand: SPK10277 JACKSON/PRO-AXIS KIT

## (undated) DEVICE — PAD MINOR UNIVERSAL: Brand: MEDLINE INDUSTRIES, INC.

## (undated) DEVICE — CLTH CLENS READYCLEANSE PERI CARE PK/5

## (undated) DEVICE — INSTRUMENT 8670001 SXT GUIDEWIRE BLUNT

## (undated) DEVICE — LT SOURCE STR TP

## (undated) DEVICE — 3.0MM PRECISION NEURO (MATCH HEAD)

## (undated) DEVICE — CONN FLX BREATHE CIRCT

## (undated) DEVICE — PCH INST SURG INVISISHIELD 2PCKT

## (undated) DEVICE — PK SPINE LAT 30

## (undated) DEVICE — SUREFIT, DUAL DISPERSIVE ELECTRODE, CONTACT QUALITY MONITOR: Brand: SUREFIT

## (undated) DEVICE — 3.2MM X 18.3MM METAL CUTTING HELIOCOIDAL RASP

## (undated) DEVICE — SUT NUROLON 0 CT1 CR8 18IN C521D

## (undated) DEVICE — PK SPINE POST 30